# Patient Record
Sex: MALE | Race: WHITE | NOT HISPANIC OR LATINO
[De-identification: names, ages, dates, MRNs, and addresses within clinical notes are randomized per-mention and may not be internally consistent; named-entity substitution may affect disease eponyms.]

---

## 2017-05-08 ENCOUNTER — APPOINTMENT (OUTPATIENT)
Dept: ENDOCRINOLOGY | Facility: CLINIC | Age: 62
End: 2017-05-08

## 2017-05-08 VITALS
HEIGHT: 68 IN | SYSTOLIC BLOOD PRESSURE: 110 MMHG | BODY MASS INDEX: 33.65 KG/M2 | HEART RATE: 100 BPM | DIASTOLIC BLOOD PRESSURE: 64 MMHG | WEIGHT: 222 LBS | OXYGEN SATURATION: 98 %

## 2017-05-08 LAB
GLUCOSE BLDC GLUCOMTR-MCNC: 118
HBA1C MFR BLD HPLC: 6.6

## 2017-05-09 LAB
25(OH)D3 SERPL-MCNC: 35.8 NG/ML
ALBUMIN SERPL ELPH-MCNC: 4.3 G/DL
ALP BLD-CCNC: 83 U/L
ALT SERPL-CCNC: 20 U/L
ANION GAP SERPL CALC-SCNC: 20 MMOL/L
AST SERPL-CCNC: 20 U/L
BASOPHILS # BLD AUTO: 0.03 K/UL
BASOPHILS NFR BLD AUTO: 0.3 %
BILIRUB SERPL-MCNC: 0.6 MG/DL
BUN SERPL-MCNC: 16 MG/DL
CALCIUM SERPL-MCNC: 9.4 MG/DL
CHLORIDE SERPL-SCNC: 97 MMOL/L
CHOLEST SERPL-MCNC: 144 MG/DL
CHOLEST/HDLC SERPL: 2 RATIO
CO2 SERPL-SCNC: 24 MMOL/L
CREAT SERPL-MCNC: 0.64 MG/DL
CREAT SPEC-SCNC: 168 MG/DL
EOSINOPHIL # BLD AUTO: 0.52 K/UL
EOSINOPHIL NFR BLD AUTO: 4.4 %
FRUCTOSAMINE SERPL-MCNC: 248 UMOL/L
GLUCOSE SERPL-MCNC: 113 MG/DL
HCT VFR BLD CALC: 41.3 %
HDLC SERPL-MCNC: 64 MG/DL
HGB BLD-MCNC: 14.2 G/DL
IMM GRANULOCYTES NFR BLD AUTO: 0.3 %
LDLC SERPL CALC-MCNC: 54 MG/DL
LYMPHOCYTES # BLD AUTO: 2.93 K/UL
LYMPHOCYTES NFR BLD AUTO: 24.6 %
MAN DIFF?: NORMAL
MCHC RBC-ENTMCNC: 31.1 PG
MCHC RBC-ENTMCNC: 34.4 GM/DL
MCV RBC AUTO: 90.6 FL
MICROALBUMIN 24H UR DL<=1MG/L-MCNC: 7.2 MG/DL
MICROALBUMIN/CREAT 24H UR-RTO: 43
MONOCYTES # BLD AUTO: 1.14 K/UL
MONOCYTES NFR BLD AUTO: 9.6 %
NEUTROPHILS # BLD AUTO: 7.24 K/UL
NEUTROPHILS NFR BLD AUTO: 60.8 %
PLATELET # BLD AUTO: 320 K/UL
POTASSIUM SERPL-SCNC: 3.8 MMOL/L
PROT SERPL-MCNC: 7.4 G/DL
RBC # BLD: 4.56 M/UL
RBC # FLD: 13.1 %
SODIUM SERPL-SCNC: 141 MMOL/L
T4 FREE SERPL-MCNC: 1.3 NG/DL
TRIGL SERPL-MCNC: 128 MG/DL
TSH SERPL-ACNC: 3 UIU/ML
WBC # FLD AUTO: 11.89 K/UL

## 2017-06-08 ENCOUNTER — MEDICATION RENEWAL (OUTPATIENT)
Age: 62
End: 2017-06-08

## 2017-06-08 ENCOUNTER — RX RENEWAL (OUTPATIENT)
Age: 62
End: 2017-06-08

## 2017-07-10 ENCOUNTER — RX RENEWAL (OUTPATIENT)
Age: 62
End: 2017-07-10

## 2017-11-07 ENCOUNTER — APPOINTMENT (OUTPATIENT)
Dept: ENDOCRINOLOGY | Facility: CLINIC | Age: 62
End: 2017-11-07
Payer: COMMERCIAL

## 2017-11-07 VITALS
BODY MASS INDEX: 33.28 KG/M2 | HEIGHT: 68 IN | OXYGEN SATURATION: 98 % | WEIGHT: 219.6 LBS | HEART RATE: 105 BPM | SYSTOLIC BLOOD PRESSURE: 120 MMHG | DIASTOLIC BLOOD PRESSURE: 72 MMHG

## 2017-11-07 PROCEDURE — 99214 OFFICE O/P EST MOD 30 MIN: CPT

## 2017-11-07 RX ORDER — AMOXICILLIN 500 MG/1
500 CAPSULE ORAL
Qty: 20 | Refills: 0 | Status: DISCONTINUED | COMMUNITY
Start: 2017-08-20

## 2017-11-08 LAB
ALBUMIN SERPL ELPH-MCNC: 4.2 G/DL
ALP BLD-CCNC: 75 U/L
ALT SERPL-CCNC: 21 U/L
ANION GAP SERPL CALC-SCNC: 17 MMOL/L
AST SERPL-CCNC: 20 U/L
BASOPHILS # BLD AUTO: 0.02 K/UL
BASOPHILS NFR BLD AUTO: 0.2 %
BILIRUB SERPL-MCNC: 0.4 MG/DL
BUN SERPL-MCNC: 16 MG/DL
CALCIUM SERPL-MCNC: 9.7 MG/DL
CHLORIDE SERPL-SCNC: 100 MMOL/L
CHOLEST SERPL-MCNC: 174 MG/DL
CHOLEST/HDLC SERPL: 2.7 RATIO
CO2 SERPL-SCNC: 27 MMOL/L
CREAT SERPL-MCNC: 0.7 MG/DL
CREAT SPEC-SCNC: 114 MG/DL
EOSINOPHIL # BLD AUTO: 0.38 K/UL
EOSINOPHIL NFR BLD AUTO: 3.9 %
GLUCOSE SERPL-MCNC: 141 MG/DL
HBA1C MFR BLD HPLC: 6.3 %
HCT VFR BLD CALC: 37.3 %
HDLC SERPL-MCNC: 65 MG/DL
HGB BLD-MCNC: 12.9 G/DL
IMM GRANULOCYTES NFR BLD AUTO: 0.2 %
LDLC SERPL CALC-MCNC: 93 MG/DL
LYMPHOCYTES # BLD AUTO: 2.19 K/UL
LYMPHOCYTES NFR BLD AUTO: 22.7 %
MAN DIFF?: NORMAL
MCHC RBC-ENTMCNC: 31 PG
MCHC RBC-ENTMCNC: 34.6 GM/DL
MCV RBC AUTO: 89.7 FL
MICROALBUMIN 24H UR DL<=1MG/L-MCNC: 7.9 MG/DL
MICROALBUMIN/CREAT 24H UR-RTO: 69 MG/G
MONOCYTES # BLD AUTO: 0.84 K/UL
MONOCYTES NFR BLD AUTO: 8.7 %
NEUTROPHILS # BLD AUTO: 6.18 K/UL
NEUTROPHILS NFR BLD AUTO: 64.3 %
PLATELET # BLD AUTO: 285 K/UL
POTASSIUM SERPL-SCNC: 3.6 MMOL/L
PROT SERPL-MCNC: 7.3 G/DL
RBC # BLD: 4.16 M/UL
RBC # FLD: 13 %
SODIUM SERPL-SCNC: 144 MMOL/L
TRIGL SERPL-MCNC: 82 MG/DL
WBC # FLD AUTO: 9.63 K/UL

## 2017-11-13 ENCOUNTER — RX RENEWAL (OUTPATIENT)
Age: 62
End: 2017-11-13

## 2018-03-14 ENCOUNTER — RX RENEWAL (OUTPATIENT)
Age: 63
End: 2018-03-14

## 2018-05-08 ENCOUNTER — APPOINTMENT (OUTPATIENT)
Dept: ENDOCRINOLOGY | Facility: CLINIC | Age: 63
End: 2018-05-08
Payer: COMMERCIAL

## 2018-05-08 VITALS
OXYGEN SATURATION: 98 % | WEIGHT: 216 LBS | BODY MASS INDEX: 32.74 KG/M2 | HEART RATE: 88 BPM | HEIGHT: 68 IN | DIASTOLIC BLOOD PRESSURE: 82 MMHG | SYSTOLIC BLOOD PRESSURE: 120 MMHG

## 2018-05-08 LAB
GLUCOSE BLDC GLUCOMTR-MCNC: 156
HBA1C MFR BLD HPLC: 6.4

## 2018-05-08 PROCEDURE — 83036 HEMOGLOBIN GLYCOSYLATED A1C: CPT | Mod: QW

## 2018-05-08 PROCEDURE — 99214 OFFICE O/P EST MOD 30 MIN: CPT | Mod: 25

## 2018-05-08 PROCEDURE — 82962 GLUCOSE BLOOD TEST: CPT

## 2018-05-09 LAB
25(OH)D3 SERPL-MCNC: 38.3 NG/ML
ALBUMIN SERPL ELPH-MCNC: 4.3 G/DL
ALP BLD-CCNC: 70 U/L
ALT SERPL-CCNC: 17 U/L
ANION GAP SERPL CALC-SCNC: 16 MMOL/L
AST SERPL-CCNC: 17 U/L
BASOPHILS # BLD AUTO: 0.03 K/UL
BASOPHILS NFR BLD AUTO: 0.3 %
BILIRUB SERPL-MCNC: 0.4 MG/DL
BUN SERPL-MCNC: 16 MG/DL
CALCIUM SERPL-MCNC: 9.7 MG/DL
CHLORIDE SERPL-SCNC: 99 MMOL/L
CHOLEST SERPL-MCNC: 152 MG/DL
CHOLEST/HDLC SERPL: 2.3 RATIO
CO2 SERPL-SCNC: 27 MMOL/L
CREAT SERPL-MCNC: 0.73 MG/DL
CREAT SPEC-SCNC: 257 MG/DL
EOSINOPHIL # BLD AUTO: 0.45 K/UL
EOSINOPHIL NFR BLD AUTO: 4.7 %
GLUCOSE SERPL-MCNC: 147 MG/DL
HCT VFR BLD CALC: 39 %
HDLC SERPL-MCNC: 67 MG/DL
HGB BLD-MCNC: 13.2 G/DL
IMM GRANULOCYTES NFR BLD AUTO: 0.1 %
LDLC SERPL CALC-MCNC: 71 MG/DL
LYMPHOCYTES # BLD AUTO: 2.22 K/UL
LYMPHOCYTES NFR BLD AUTO: 23.1 %
MAN DIFF?: NORMAL
MCHC RBC-ENTMCNC: 30.7 PG
MCHC RBC-ENTMCNC: 33.8 GM/DL
MCV RBC AUTO: 90.7 FL
MICROALBUMIN 24H UR DL<=1MG/L-MCNC: 6 MG/DL
MICROALBUMIN/CREAT 24H UR-RTO: 23 MG/G
MONOCYTES # BLD AUTO: 0.92 K/UL
MONOCYTES NFR BLD AUTO: 9.6 %
NEUTROPHILS # BLD AUTO: 5.97 K/UL
NEUTROPHILS NFR BLD AUTO: 62.2 %
PLATELET # BLD AUTO: 309 K/UL
POTASSIUM SERPL-SCNC: 3.9 MMOL/L
PROT SERPL-MCNC: 7.4 G/DL
RBC # BLD: 4.3 M/UL
RBC # FLD: 13.1 %
SODIUM SERPL-SCNC: 142 MMOL/L
T4 FREE SERPL-MCNC: 1.3 NG/DL
TRIGL SERPL-MCNC: 68 MG/DL
TSH SERPL-ACNC: 2.61 UIU/ML
VIT B12 SERPL-MCNC: 293 PG/ML
WBC # FLD AUTO: 9.6 K/UL

## 2018-07-18 ENCOUNTER — RX RENEWAL (OUTPATIENT)
Age: 63
End: 2018-07-18

## 2018-11-14 ENCOUNTER — APPOINTMENT (OUTPATIENT)
Dept: ENDOCRINOLOGY | Facility: CLINIC | Age: 63
End: 2018-11-14
Payer: COMMERCIAL

## 2018-11-14 VITALS
HEART RATE: 110 BPM | OXYGEN SATURATION: 96 % | WEIGHT: 211.2 LBS | HEIGHT: 68 IN | DIASTOLIC BLOOD PRESSURE: 70 MMHG | BODY MASS INDEX: 32.01 KG/M2 | SYSTOLIC BLOOD PRESSURE: 110 MMHG

## 2018-11-14 LAB — HBA1C MFR BLD HPLC: 6.1

## 2018-11-14 PROCEDURE — 99214 OFFICE O/P EST MOD 30 MIN: CPT

## 2018-11-14 PROCEDURE — 83036 HEMOGLOBIN GLYCOSYLATED A1C: CPT | Mod: QW

## 2018-11-15 LAB
25(OH)D3 SERPL-MCNC: 42.8 NG/ML
ALBUMIN SERPL ELPH-MCNC: 4.3 G/DL
ALP BLD-CCNC: 82 U/L
ALT SERPL-CCNC: 16 U/L
ANION GAP SERPL CALC-SCNC: 19 MMOL/L
AST SERPL-CCNC: 21 U/L
BASOPHILS # BLD AUTO: 0.05 K/UL
BASOPHILS NFR BLD AUTO: 0.6 %
BILIRUB SERPL-MCNC: 0.6 MG/DL
BUN SERPL-MCNC: 14 MG/DL
CALCIUM SERPL-MCNC: 10.3 MG/DL
CHLORIDE SERPL-SCNC: 94 MMOL/L
CHOLEST SERPL-MCNC: 143 MG/DL
CHOLEST/HDLC SERPL: 2.3 RATIO
CO2 SERPL-SCNC: 25 MMOL/L
CREAT SERPL-MCNC: 0.78 MG/DL
CREAT SPEC-SCNC: 140 MG/DL
EOSINOPHIL # BLD AUTO: 0.44 K/UL
EOSINOPHIL NFR BLD AUTO: 4.9 %
GLUCOSE SERPL-MCNC: 123 MG/DL
HCT VFR BLD CALC: 41.1 %
HDLC SERPL-MCNC: 63 MG/DL
HGB BLD-MCNC: 14 G/DL
IMM GRANULOCYTES NFR BLD AUTO: 0.2 %
LDLC SERPL CALC-MCNC: 69 MG/DL
LYMPHOCYTES # BLD AUTO: 2.59 K/UL
LYMPHOCYTES NFR BLD AUTO: 29 %
MAN DIFF?: NORMAL
MCHC RBC-ENTMCNC: 31 PG
MCHC RBC-ENTMCNC: 34.1 GM/DL
MCV RBC AUTO: 91.1 FL
MICROALBUMIN 24H UR DL<=1MG/L-MCNC: 4.2 MG/DL
MICROALBUMIN/CREAT 24H UR-RTO: 30 MG/G
MONOCYTES # BLD AUTO: 0.97 K/UL
MONOCYTES NFR BLD AUTO: 10.9 %
NEUTROPHILS # BLD AUTO: 4.86 K/UL
NEUTROPHILS NFR BLD AUTO: 54.4 %
PLATELET # BLD AUTO: 370 K/UL
POTASSIUM SERPL-SCNC: 3.6 MMOL/L
PROT SERPL-MCNC: 7.2 G/DL
RBC # BLD: 4.51 M/UL
RBC # FLD: 12.8 %
SODIUM SERPL-SCNC: 138 MMOL/L
T4 FREE SERPL-MCNC: 1.4 NG/DL
TRIGL SERPL-MCNC: 57 MG/DL
TSH SERPL-ACNC: 2.51 UIU/ML
VIT B12 SERPL-MCNC: 290 PG/ML
WBC # FLD AUTO: 8.93 K/UL

## 2019-05-07 ENCOUNTER — RX RENEWAL (OUTPATIENT)
Age: 64
End: 2019-05-07

## 2019-05-14 ENCOUNTER — APPOINTMENT (OUTPATIENT)
Dept: ENDOCRINOLOGY | Facility: CLINIC | Age: 64
End: 2019-05-14
Payer: COMMERCIAL

## 2019-05-14 VITALS
WEIGHT: 219.13 LBS | HEIGHT: 68 IN | DIASTOLIC BLOOD PRESSURE: 80 MMHG | OXYGEN SATURATION: 97 % | BODY MASS INDEX: 33.21 KG/M2 | SYSTOLIC BLOOD PRESSURE: 120 MMHG | HEART RATE: 96 BPM

## 2019-05-14 LAB
GLUCOSE BLDC GLUCOMTR-MCNC: 119
HBA1C MFR BLD HPLC: 6.4

## 2019-05-14 PROCEDURE — 83036 HEMOGLOBIN GLYCOSYLATED A1C: CPT | Mod: QW

## 2019-05-14 PROCEDURE — 82962 GLUCOSE BLOOD TEST: CPT

## 2019-05-14 PROCEDURE — 99214 OFFICE O/P EST MOD 30 MIN: CPT | Mod: 25

## 2019-05-14 NOTE — PHYSICAL EXAM
[Alert] : alert [No Acute Distress] : no acute distress [Normal Sclera/Conjunctiva] : normal sclera/conjunctiva [EOMI] : extra ocular movement intact [Supple] : the neck was supple [No LAD] : no lymphadenopathy [Thyroid Not Enlarged] : the thyroid was not enlarged [No Accessory Muscle Use] : no accessory muscle use [Clear to Auscultation] : lungs were clear to auscultation bilaterally [Normal S1, S2] : normal S1 and S2 [Pedal Pulses Normal] : the pedal pulses are present [Regular Rhythm] : with a regular rhythm [No Edema] : there was no peripheral edema [Not Tender] : non-tender [Normal Bowel Sounds] : normal bowel sounds [Soft] : abdomen soft [Not Distended] : not distended [No Clubbing, Cyanosis] : no clubbing  or cyanosis of the fingernails [Right Foot Was Examined] : right foot ~C was examined [Left Foot Was Examined] : left foot ~C was examined [2+] : 2+ in the dorsalis pedis [Normal] : normal [Diminished Throughout Both Feet] : diminished tactile sensation with monofilament testing throughout both feet [Normal Reflexes] : deep tendon reflexes were 2+ and symmetric [Normal Affect] : the affect was normal [Normal Mood] : the mood was normal [de-identified] : does have psoriasis on abdomen and LE b/l [FreeTextEntry1] : callus [FreeTextEntry5] : callus

## 2019-05-14 NOTE — HISTORY OF PRESENT ILLNESS
[FreeTextEntry1] : 64 y.o. male with h/o Type 2 DM, HTN and hyperlipidemia here for follow up visit. No acute events since last visit. Reports some dizziness first thing in the morning rarely. Checks FS in the morning every 2 weeks and are variable with late afternoon readings being in the 130s. No hypoglycemia. No polyuria and no polydipsia.  Taking Metformin 500 mg po QID. No vision changes. UTD with optho (March 2019)and no retinopathy. UTD with podiatry (sees every 8 weeks) and no complaints. Diagnosed with plantar wart and being monitored. Has lost weight and eating less. Does exercise with stationary bike 3 to 4 times per week. Less walking. Saw cardiology in February 2019 for nuclear stress test and was normal. \par \par For vitamin D def, taking vitamin D3 2,000 IU daily.

## 2019-05-14 NOTE — ASSESSMENT
[Carbohydrate Consistent Diet] : carbohydrate consistent diet [Long Term Vascular Complications] : long term vascular complications of diabetes [Diabetes Foot Care] : diabetes foot care [Importance of Diet and Exercise] : importance of diet and exercise to improve glycemic control, achieve weight loss and improve cardiovascular health [FreeTextEntry1] : 64 y.o. male with h/o Type 2 DM, HTN, hyperlipidemia and vitamin D def.\par 1. Type 2 DM- Good overall control with Hba1c of 6.4% today. Encouraged carbohydrate consistent diet and exercise. Will continue Metformin 500 mg QID. Will check CMP  and urine microalb/cr ratio.\par 2. HTN- BP is at goal and will continue medications\par 3. Hyperlipidemia- Will check lipids and will continue statin\par 4. Vitamin D def- Will check 25 vitamin D level and adjust supplement accordingly\par \par Follow up in 6 months\par Consider dermatology follow up

## 2019-05-14 NOTE — REVIEW OF SYSTEMS
[Recent Weight Gain (___ Lbs)] : recent [unfilled] ~Ulb weight gain [Pain/Numbness of Digits] : pain/numbness of digits [Negative] : Gastrointestinal [Fatigue] : no fatigue [Recent Weight Loss (___ Lbs)] : no recent weight loss [Polyuria] : no polyuria [Polydipsia] : no polydipsia [Swelling] : no swelling

## 2019-05-15 LAB
25(OH)D3 SERPL-MCNC: 37.3 NG/ML
ALBUMIN SERPL ELPH-MCNC: 4.6 G/DL
ALP BLD-CCNC: 86 U/L
ALT SERPL-CCNC: 20 U/L
ANION GAP SERPL CALC-SCNC: 18 MMOL/L
AST SERPL-CCNC: 17 U/L
BASOPHILS # BLD AUTO: 0.08 K/UL
BASOPHILS NFR BLD AUTO: 0.8 %
BILIRUB SERPL-MCNC: 0.5 MG/DL
BUN SERPL-MCNC: 16 MG/DL
CALCIUM SERPL-MCNC: 10 MG/DL
CHLORIDE SERPL-SCNC: 97 MMOL/L
CHOLEST SERPL-MCNC: 164 MG/DL
CHOLEST/HDLC SERPL: 2.6 RATIO
CO2 SERPL-SCNC: 26 MMOL/L
CREAT SERPL-MCNC: 0.67 MG/DL
CREAT SPEC-SCNC: 225 MG/DL
EOSINOPHIL # BLD AUTO: 0.39 K/UL
EOSINOPHIL NFR BLD AUTO: 3.9 %
GLUCOSE SERPL-MCNC: 121 MG/DL
HCT VFR BLD CALC: 40.6 %
HDLC SERPL-MCNC: 64 MG/DL
HGB BLD-MCNC: 13.7 G/DL
IMM GRANULOCYTES NFR BLD AUTO: 0.3 %
LDLC SERPL CALC-MCNC: 84 MG/DL
LYMPHOCYTES # BLD AUTO: 2.64 K/UL
LYMPHOCYTES NFR BLD AUTO: 26.6 %
MAN DIFF?: NORMAL
MCHC RBC-ENTMCNC: 30.6 PG
MCHC RBC-ENTMCNC: 33.7 GM/DL
MCV RBC AUTO: 90.6 FL
MICROALBUMIN 24H UR DL<=1MG/L-MCNC: 7.5 MG/DL
MICROALBUMIN/CREAT 24H UR-RTO: 33 MG/G
MONOCYTES # BLD AUTO: 0.96 K/UL
MONOCYTES NFR BLD AUTO: 9.7 %
NEUTROPHILS # BLD AUTO: 5.82 K/UL
NEUTROPHILS NFR BLD AUTO: 58.7 %
PLATELET # BLD AUTO: 339 K/UL
POTASSIUM SERPL-SCNC: 3.6 MMOL/L
PROT SERPL-MCNC: 7.7 G/DL
RBC # BLD: 4.48 M/UL
RBC # FLD: 12.7 %
SODIUM SERPL-SCNC: 140 MMOL/L
TRIGL SERPL-MCNC: 79 MG/DL
TSH SERPL-ACNC: 3.12 UIU/ML
VIT B12 SERPL-MCNC: 310 PG/ML
WBC # FLD AUTO: 9.92 K/UL

## 2019-11-18 ENCOUNTER — APPOINTMENT (OUTPATIENT)
Dept: ENDOCRINOLOGY | Facility: CLINIC | Age: 64
End: 2019-11-18
Payer: COMMERCIAL

## 2019-11-18 VITALS
HEIGHT: 68 IN | BODY MASS INDEX: 32.13 KG/M2 | TEMPERATURE: 97.9 F | HEART RATE: 75 BPM | OXYGEN SATURATION: 95 % | SYSTOLIC BLOOD PRESSURE: 137 MMHG | WEIGHT: 212 LBS | DIASTOLIC BLOOD PRESSURE: 81 MMHG

## 2019-11-18 VITALS — SYSTOLIC BLOOD PRESSURE: 120 MMHG | DIASTOLIC BLOOD PRESSURE: 64 MMHG

## 2019-11-18 LAB — HBA1C MFR BLD HPLC: 6.3

## 2019-11-18 PROCEDURE — 36415 COLL VENOUS BLD VENIPUNCTURE: CPT

## 2019-11-18 PROCEDURE — 99214 OFFICE O/P EST MOD 30 MIN: CPT | Mod: 25

## 2019-11-18 PROCEDURE — 83036 HEMOGLOBIN GLYCOSYLATED A1C: CPT | Mod: QW

## 2019-11-18 NOTE — ASSESSMENT
[Carbohydrate Consistent Diet] : carbohydrate consistent diet [FreeTextEntry1] : 64 y.o. male with h/o Type 2 DM, HTN, hyperlipidemia and vitamin D def.\par 1. Type 2 DM- Good overall control with Hba1c of 6.3% today. Encouraged carbohydrate consistent diet and exercise. Will continue Metformin 500 mg QID. Will check CMP  and urine microalb/cr ratio.\par 2. HTN- BP is at goal and will continue medications\par 3. Hyperlipidemia- Will check lipids and will continue statin\par 4. Vitamin D def- Normal 25 vitamin D level and will continue supplement\par \par Follow up in 6 months\par Agree with dermatology follow up\par Already received flu vaccine [Diabetes Foot Care] : diabetes foot care [Importance of Diet and Exercise] : importance of diet and exercise to improve glycemic control, achieve weight loss and improve cardiovascular health [Long Term Vascular Complications] : long term vascular complications of diabetes

## 2019-11-18 NOTE — PHYSICAL EXAM
[Alert] : alert [No Acute Distress] : no acute distress [Normal Sclera/Conjunctiva] : normal sclera/conjunctiva [EOMI] : extra ocular movement intact [Supple] : the neck was supple [No LAD] : no lymphadenopathy [Thyroid Not Enlarged] : the thyroid was not enlarged [Clear to Auscultation] : lungs were clear to auscultation bilaterally [No Accessory Muscle Use] : no accessory muscle use [Regular Rhythm] : with a regular rhythm [Normal S1, S2] : normal S1 and S2 [Pedal Pulses Normal] : the pedal pulses are present [Normal Bowel Sounds] : normal bowel sounds [Not Tender] : non-tender [Soft] : abdomen soft [No Clubbing, Cyanosis] : no clubbing  or cyanosis of the fingernails [Not Distended] : not distended [Right Foot Was Examined] : right foot ~C was examined [Left Foot Was Examined] : left foot ~C was examined [Normal] : normal [2+] : 2+ in the dorsalis pedis [Diminished Throughout Both Feet] : diminished tactile sensation with monofilament testing throughout both feet [Normal Reflexes] : deep tendon reflexes were 2+ and symmetric [Normal Affect] : the affect was normal [Normal Mood] : the mood was normal [de-identified] : 2/6 STEPHAN [de-identified] : mild edema b/l [de-identified] : does have psoriasis on abdomen and LE b/l [FreeTextEntry1] : callus [FreeTextEntry5] : callus

## 2019-11-18 NOTE — REVIEW OF SYSTEMS
[Fatigue] : no fatigue [Recent Weight Gain (___ Lbs)] : no recent weight gain [Recent Weight Loss (___ Lbs)] : recent [unfilled] ~Ulb weight loss [Joint Pain] : joint pain [Polyuria] : no polyuria [Swelling] : swelling [Pain/Numbness of Digits] : pain/numbness of digits [Polydipsia] : no polydipsia [Negative] : Cardiovascular

## 2019-11-19 LAB
ALBUMIN SERPL ELPH-MCNC: 4.5 G/DL
ALP BLD-CCNC: 76 U/L
ALT SERPL-CCNC: 15 U/L
ANION GAP SERPL CALC-SCNC: 18 MMOL/L
AST SERPL-CCNC: 16 U/L
BASOPHILS # BLD AUTO: 0.05 K/UL
BASOPHILS NFR BLD AUTO: 0.5 %
BILIRUB SERPL-MCNC: 0.3 MG/DL
BUN SERPL-MCNC: 20 MG/DL
CALCIUM SERPL-MCNC: 10.1 MG/DL
CHLORIDE SERPL-SCNC: 99 MMOL/L
CHOLEST SERPL-MCNC: 181 MG/DL
CHOLEST/HDLC SERPL: 2.3 RATIO
CO2 SERPL-SCNC: 24 MMOL/L
CREAT SERPL-MCNC: 0.7 MG/DL
EOSINOPHIL # BLD AUTO: 0.27 K/UL
EOSINOPHIL NFR BLD AUTO: 2.5 %
GLUCOSE SERPL-MCNC: 110 MG/DL
HCT VFR BLD CALC: 40.1 %
HDLC SERPL-MCNC: 78 MG/DL
HGB BLD-MCNC: 13.4 G/DL
IMM GRANULOCYTES NFR BLD AUTO: 0.2 %
LDLC SERPL CALC-MCNC: 84 MG/DL
LYMPHOCYTES # BLD AUTO: 2.47 K/UL
LYMPHOCYTES NFR BLD AUTO: 22.6 %
MAN DIFF?: NORMAL
MCHC RBC-ENTMCNC: 31.1 PG
MCHC RBC-ENTMCNC: 33.4 GM/DL
MCV RBC AUTO: 93 FL
MONOCYTES # BLD AUTO: 0.82 K/UL
MONOCYTES NFR BLD AUTO: 7.5 %
NEUTROPHILS # BLD AUTO: 7.31 K/UL
NEUTROPHILS NFR BLD AUTO: 66.7 %
PLATELET # BLD AUTO: 353 K/UL
POTASSIUM SERPL-SCNC: 3.8 MMOL/L
PROT SERPL-MCNC: 7.5 G/DL
RBC # BLD: 4.31 M/UL
RBC # FLD: 12.6 %
SODIUM SERPL-SCNC: 141 MMOL/L
TRIGL SERPL-MCNC: 94 MG/DL
TSH SERPL-ACNC: 2.44 UIU/ML
VIT B12 SERPL-MCNC: 236 PG/ML
WBC # FLD AUTO: 10.94 K/UL

## 2020-05-07 ENCOUNTER — RX RENEWAL (OUTPATIENT)
Age: 65
End: 2020-05-07

## 2020-06-01 ENCOUNTER — RX RENEWAL (OUTPATIENT)
Age: 65
End: 2020-06-01

## 2020-06-05 ENCOUNTER — RX RENEWAL (OUTPATIENT)
Age: 65
End: 2020-06-05

## 2020-09-28 ENCOUNTER — APPOINTMENT (OUTPATIENT)
Dept: ENDOCRINOLOGY | Facility: CLINIC | Age: 65
End: 2020-09-28
Payer: COMMERCIAL

## 2020-09-28 VITALS
OXYGEN SATURATION: 98 % | WEIGHT: 220.06 LBS | TEMPERATURE: 98 F | HEART RATE: 101 BPM | SYSTOLIC BLOOD PRESSURE: 121 MMHG | HEIGHT: 68 IN | DIASTOLIC BLOOD PRESSURE: 76 MMHG | BODY MASS INDEX: 33.35 KG/M2

## 2020-09-28 LAB
GLUCOSE BLDC GLUCOMTR-MCNC: 115
HBA1C MFR BLD HPLC: 5.9

## 2020-09-28 PROCEDURE — 36415 COLL VENOUS BLD VENIPUNCTURE: CPT

## 2020-09-28 PROCEDURE — 83036 HEMOGLOBIN GLYCOSYLATED A1C: CPT | Mod: QW

## 2020-09-28 PROCEDURE — 99214 OFFICE O/P EST MOD 30 MIN: CPT | Mod: 25

## 2020-09-28 PROCEDURE — 82962 GLUCOSE BLOOD TEST: CPT | Mod: NC

## 2020-09-28 NOTE — HISTORY OF PRESENT ILLNESS
[FreeTextEntry1] : 65 y.o. male with h/o Type 2 DM, HTN and hyperlipidemia here for follow up visit. S/p left hip replacement in February 2020. Checks FS in the morning every 2 weeks and are variable with late afternoon readings being in the 120s and morning levels are in the 160s. No hypoglycemia. No polyuria and no polydipsia.  Taking Metformin 500 mg po QID. No vision changes. UTD with ophthalmology (March 2020) and no retinopathy. UTD with podiatry (sees every 8 weeks) and no complaints. Diagnosed with plantar wart and being monitored. Does have proteinuria. Does report weight gain. Does exercise with stationary bike 3 to 4 times per week. Doing some walking. Saw cardiology in February 2019 for nuclear stress test and was normal. Also seeing dermatology for psoriasis. \par \par For vitamin D def, taking vitamin D3 2,000 IU daily. \par \par Also taking vitamin B12 1,000 mcg daily.

## 2020-09-28 NOTE — REVIEW OF SYSTEMS
[Fatigue] : no fatigue [Recent Weight Gain (___ Lbs)] : recent [unfilled] ~Ulb weight gain [Recent Weight Loss (___ Lbs)] : no recent weight loss [Polyuria] : no polyuria [Joint Pain] : no joint pain [Pain/Numbness of Digits] : pain/numbness of digits [Polydipsia] : no polydipsia [Swelling] : swelling [Negative] : Gastrointestinal

## 2020-09-28 NOTE — ASSESSMENT
[FreeTextEntry1] : 65 y.o. male with h/o Type 2 DM, HTN, hyperlipidemia and vitamin D def.\par 1. Type 2 DM- Good overall control with Hba1c of 5.9% today. Encouraged carbohydrate consistent diet and exercise. Will continue Metformin 500 mg QID. Will check CMP  and urine microalb/cr ratio.\par 2. HTN- BP is at goal and will continue medications\par 3. Hyperlipidemia- Will check lipids and will continue statin\par 4. Vitamin D def- Will check 25 vitamin D level and will continue supplement\par \par Follow up in 6 months\par Agree with dermatology follow up\par Already received flu vaccine\par Labs drawn in the office today [Carbohydrate Consistent Diet] : carbohydrate consistent diet [Diabetes Foot Care] : diabetes foot care [Long Term Vascular Complications] : long term vascular complications of diabetes [Importance of Diet and Exercise] : importance of diet and exercise to improve glycemic control, achieve weight loss and improve cardiovascular health

## 2020-09-28 NOTE — PHYSICAL EXAM
[Alert] : alert [No Acute Distress] : no acute distress [Normal Sclera/Conjunctiva] : normal sclera/conjunctiva [EOMI] : extra ocular movement intact [No LAD] : no lymphadenopathy [Thyroid Not Enlarged] : the thyroid was not enlarged [No Thyroid Nodules] : no palpable thyroid nodules [No Respiratory Distress] : no respiratory distress [Clear to Auscultation] : lungs were clear to auscultation bilaterally [Normal S1, S2] : normal S1 and S2 [Regular Rhythm] : with a regular rhythm [Normal Bowel Sounds] : normal bowel sounds [Not Tender] : non-tender [Not Distended] : not distended [Soft] : abdomen soft [Normal Anterior Cervical Nodes] : no anterior cervical lymphadenopathy [No Clubbing, Cyanosis] : no clubbing  or cyanosis of the fingernails [Right Foot Was Examined] : right foot ~C was examined [Left Foot Was Examined] : left foot ~C was examined [Normal] : normal [2+] : 2+ in the dorsalis pedis [Diminished Throughout Both Feet] : diminished tactile sensation with monofilament testing throughout both feet [Normal Reflexes] : deep tendon reflexes were 2+ and symmetric [Normal Affect] : the affect was normal [Normal Mood] : the mood was normal [de-identified] : 3/6 STEPHAN [de-identified] : mild edema b/l [de-identified] : psoriatic lesions on abdomen and LE b/l [FreeTextEntry1] : callus [FreeTextEntry5] : callus

## 2020-09-29 LAB
25(OH)D3 SERPL-MCNC: 42 NG/ML
ALBUMIN SERPL ELPH-MCNC: 4.5 G/DL
ALP BLD-CCNC: 88 U/L
ALT SERPL-CCNC: 19 U/L
ANION GAP SERPL CALC-SCNC: 19 MMOL/L
AST SERPL-CCNC: 20 U/L
BASOPHILS # BLD AUTO: 0.05 K/UL
BASOPHILS NFR BLD AUTO: 0.5 %
BILIRUB SERPL-MCNC: 0.5 MG/DL
BUN SERPL-MCNC: 22 MG/DL
CALCIUM SERPL-MCNC: 10.2 MG/DL
CHLORIDE SERPL-SCNC: 93 MMOL/L
CHOLEST SERPL-MCNC: 163 MG/DL
CHOLEST/HDLC SERPL: 2.3 RATIO
CO2 SERPL-SCNC: 26 MMOL/L
CREAT SERPL-MCNC: 0.82 MG/DL
CREAT SPEC-SCNC: 291 MG/DL
EOSINOPHIL # BLD AUTO: 0.32 K/UL
EOSINOPHIL NFR BLD AUTO: 3.3 %
GLUCOSE SERPL-MCNC: 116 MG/DL
HCT VFR BLD CALC: 40.2 %
HDLC SERPL-MCNC: 71 MG/DL
HGB BLD-MCNC: 13.3 G/DL
IMM GRANULOCYTES NFR BLD AUTO: 0.2 %
LDLC SERPL CALC-MCNC: 72 MG/DL
LYMPHOCYTES # BLD AUTO: 1.81 K/UL
LYMPHOCYTES NFR BLD AUTO: 18.4 %
MAN DIFF?: NORMAL
MCHC RBC-ENTMCNC: 31.5 PG
MCHC RBC-ENTMCNC: 33.1 GM/DL
MCV RBC AUTO: 95.3 FL
MICROALBUMIN 24H UR DL<=1MG/L-MCNC: 3.3 MG/DL
MICROALBUMIN/CREAT 24H UR-RTO: 11 MG/G
MONOCYTES # BLD AUTO: 1.09 K/UL
MONOCYTES NFR BLD AUTO: 11.1 %
NEUTROPHILS # BLD AUTO: 6.54 K/UL
NEUTROPHILS NFR BLD AUTO: 66.5 %
PLATELET # BLD AUTO: 278 K/UL
POTASSIUM SERPL-SCNC: 4.3 MMOL/L
PROT SERPL-MCNC: 7.4 G/DL
PSA SERPL-MCNC: 1.94 NG/ML
RBC # BLD: 4.22 M/UL
RBC # FLD: 12.8 %
SODIUM SERPL-SCNC: 138 MMOL/L
TRIGL SERPL-MCNC: 103 MG/DL
TSH SERPL-ACNC: 2.73 UIU/ML
VIT B12 SERPL-MCNC: 1243 PG/ML
WBC # FLD AUTO: 9.83 K/UL

## 2020-11-02 ENCOUNTER — RX RENEWAL (OUTPATIENT)
Age: 65
End: 2020-11-02

## 2021-03-22 ENCOUNTER — APPOINTMENT (OUTPATIENT)
Dept: ENDOCRINOLOGY | Facility: CLINIC | Age: 66
End: 2021-03-22
Payer: COMMERCIAL

## 2021-03-22 VITALS
WEIGHT: 224 LBS | TEMPERATURE: 98.2 F | HEART RATE: 89 BPM | OXYGEN SATURATION: 97 % | SYSTOLIC BLOOD PRESSURE: 134 MMHG | DIASTOLIC BLOOD PRESSURE: 72 MMHG | HEIGHT: 68 IN | BODY MASS INDEX: 33.95 KG/M2

## 2021-03-22 LAB — HBA1C MFR BLD HPLC: 6.2

## 2021-03-22 PROCEDURE — 83036 HEMOGLOBIN GLYCOSYLATED A1C: CPT | Mod: QW

## 2021-03-22 PROCEDURE — 36415 COLL VENOUS BLD VENIPUNCTURE: CPT

## 2021-03-22 PROCEDURE — 99214 OFFICE O/P EST MOD 30 MIN: CPT | Mod: 25

## 2021-03-22 PROCEDURE — 99072 ADDL SUPL MATRL&STAF TM PHE: CPT

## 2021-03-22 RX ORDER — IMIQUIMOD 50 MG/G
5 CREAM TOPICAL
Qty: 24 | Refills: 0 | Status: ACTIVE | COMMUNITY
Start: 2021-03-18

## 2021-03-22 RX ORDER — ATORVASTATIN CALCIUM 80 MG/1
80 TABLET, FILM COATED ORAL
Qty: 30 | Refills: 0 | Status: ACTIVE | COMMUNITY
Start: 2021-01-19

## 2021-03-22 NOTE — HISTORY OF PRESENT ILLNESS
[FreeTextEntry1] : 66 y.o. male with h/o Type 2 DM, HTN and hyperlipidemia here for follow up visit. S/p left hip replacement in February 2020. Checks FS in the morning every 2 weeks and are variable with late afternoon readings being in the 100s to 120s and morning levels are in the 170s. No hypoglycemia. No polyuria and no polydipsia.  Taking Metformin 500 mg po QID. No vision changes. UTD with ophthalmology (2/2021) and no retinopathy. Needs eyeglasses for driving. UTD with podiatry (sees every 8 weeks) and no complaints. Diagnosed with plantar wart and being monitored. Does have proteinuria. Does report weight gain. Reports increase in snacking.  Does exercise with stationary bike 3 to 4 times per week. Doing some walking. Saw cardiology a few months and statin was increased. Had nuclear stress test and was normal. Also was seeing dermatology for psoriasis. \par \par For vitamin D def, taking vitamin D3 2,000 IU daily. \par \par Also taking vitamin B12 1,000 mcg daily.

## 2021-03-22 NOTE — ASSESSMENT
[FreeTextEntry1] : 66 y.o. male with h/o Type 2 DM, HTN, hyperlipidemia and vitamin D def.\par \par 1. Type 2 DM- Good overall control with Hba1c of 6.2% today. Encouraged carbohydrate consistent diet and exercise. Will continue Metformin 500 mg QID. Will check CMP and urine microalb/cr ratio.\par \par 2. HTN- BP is at goal and will continue medications\par \par 3. Hyperlipidemia- Will check lipids and will continue high intensity statin\par \par 4. Vitamin D def- Will check 25 vitamin D level and will continue supplement\par \par Follow up in 6 months\par Labs drawn in the office today [Carbohydrate Consistent Diet] : carbohydrate consistent diet [Diabetes Foot Care] : diabetes foot care [Long Term Vascular Complications] : long term vascular complications of diabetes [Importance of Diet and Exercise] : importance of diet and exercise to improve glycemic control, achieve weight loss and improve cardiovascular health

## 2021-03-22 NOTE — PHYSICAL EXAM
[Alert] : alert [No Acute Distress] : no acute distress [Normal Sclera/Conjunctiva] : normal sclera/conjunctiva [EOMI] : extra ocular movement intact [No LAD] : no lymphadenopathy [Thyroid Not Enlarged] : the thyroid was not enlarged [No Thyroid Nodules] : no palpable thyroid nodules [No Respiratory Distress] : no respiratory distress [Clear to Auscultation] : lungs were clear to auscultation bilaterally [Normal S1, S2] : normal S1 and S2 [Regular Rhythm] : with a regular rhythm [Normal Bowel Sounds] : normal bowel sounds [Not Tender] : non-tender [Not Distended] : not distended [Soft] : abdomen soft [Normal Anterior Cervical Nodes] : no anterior cervical lymphadenopathy [No Clubbing, Cyanosis] : no clubbing  or cyanosis of the fingernails [Right Foot Was Examined] : right foot ~C was examined [Left Foot Was Examined] : left foot ~C was examined [Normal] : normal [2+] : 2+ in the dorsalis pedis [Diminished Throughout Both Feet] : diminished tactile sensation with monofilament testing throughout both feet [Normal Reflexes] : deep tendon reflexes were 2+ and symmetric [Normal Affect] : the affect was normal [Normal Mood] : the mood was normal [de-identified] : 3/6 STEPHAN [de-identified] : mild edema b/l [de-identified] : psoriatic lesions on  LE b/l [FreeTextEntry1] : callus [FreeTextEntry5] : callus

## 2021-03-23 LAB
25(OH)D3 SERPL-MCNC: 43.1 NG/ML
ALBUMIN SERPL ELPH-MCNC: 4.5 G/DL
ALP BLD-CCNC: 96 U/L
ALT SERPL-CCNC: 16 U/L
ANION GAP SERPL CALC-SCNC: 16 MMOL/L
AST SERPL-CCNC: 18 U/L
BASOPHILS # BLD AUTO: 0.06 K/UL
BASOPHILS NFR BLD AUTO: 0.7 %
BILIRUB SERPL-MCNC: 0.6 MG/DL
BUN SERPL-MCNC: 17 MG/DL
CALCIUM SERPL-MCNC: 9.9 MG/DL
CHLORIDE SERPL-SCNC: 96 MMOL/L
CHOLEST SERPL-MCNC: 150 MG/DL
CO2 SERPL-SCNC: 28 MMOL/L
CREAT SERPL-MCNC: 0.72 MG/DL
CREAT SPEC-SCNC: 137 MG/DL
EOSINOPHIL # BLD AUTO: 0.57 K/UL
EOSINOPHIL NFR BLD AUTO: 6.6 %
FRUCTOSAMINE SERPL-MCNC: 253 UMOL/L
GLUCOSE SERPL-MCNC: 132 MG/DL
HCT VFR BLD CALC: 41.2 %
HDLC SERPL-MCNC: 68 MG/DL
HGB BLD-MCNC: 13.5 G/DL
IMM GRANULOCYTES NFR BLD AUTO: 0.2 %
LDLC SERPL CALC-MCNC: 69 MG/DL
LYMPHOCYTES # BLD AUTO: 2.26 K/UL
LYMPHOCYTES NFR BLD AUTO: 26 %
MAN DIFF?: NORMAL
MCHC RBC-ENTMCNC: 31 PG
MCHC RBC-ENTMCNC: 32.8 GM/DL
MCV RBC AUTO: 94.7 FL
MICROALBUMIN 24H UR DL<=1MG/L-MCNC: 7.7 MG/DL
MICROALBUMIN/CREAT 24H UR-RTO: 56 MG/G
MONOCYTES # BLD AUTO: 0.89 K/UL
MONOCYTES NFR BLD AUTO: 10.3 %
NEUTROPHILS # BLD AUTO: 4.88 K/UL
NEUTROPHILS NFR BLD AUTO: 56.2 %
NONHDLC SERPL-MCNC: 82 MG/DL
PLATELET # BLD AUTO: 304 K/UL
POTASSIUM SERPL-SCNC: 4.2 MMOL/L
PROT SERPL-MCNC: 7.5 G/DL
RBC # BLD: 4.35 M/UL
RBC # FLD: 12.6 %
SODIUM SERPL-SCNC: 140 MMOL/L
TRIGL SERPL-MCNC: 66 MG/DL
TSH SERPL-ACNC: 2.59 UIU/ML
VIT B12 SERPL-MCNC: 1395 PG/ML
WBC # FLD AUTO: 8.68 K/UL

## 2021-09-20 ENCOUNTER — RX RENEWAL (OUTPATIENT)
Age: 66
End: 2021-09-20

## 2021-09-27 ENCOUNTER — APPOINTMENT (OUTPATIENT)
Dept: ENDOCRINOLOGY | Facility: CLINIC | Age: 66
End: 2021-09-27
Payer: COMMERCIAL

## 2021-09-27 VITALS
BODY MASS INDEX: 32.74 KG/M2 | HEART RATE: 87 BPM | OXYGEN SATURATION: 98 % | TEMPERATURE: 98.6 F | WEIGHT: 216 LBS | HEIGHT: 68 IN | SYSTOLIC BLOOD PRESSURE: 120 MMHG | DIASTOLIC BLOOD PRESSURE: 70 MMHG

## 2021-09-27 LAB — HBA1C MFR BLD HPLC: 5.9

## 2021-09-27 PROCEDURE — 36415 COLL VENOUS BLD VENIPUNCTURE: CPT

## 2021-09-27 PROCEDURE — 99214 OFFICE O/P EST MOD 30 MIN: CPT | Mod: 25

## 2021-09-27 PROCEDURE — 83036 HEMOGLOBIN GLYCOSYLATED A1C: CPT | Mod: QW

## 2021-09-27 RX ORDER — ACITRETIN 10 MG/1
10 CAPSULE ORAL
Qty: 30 | Refills: 0 | Status: ACTIVE | COMMUNITY
Start: 2021-08-30

## 2021-09-27 RX ORDER — CYCLOSPORINE 100 MG/1
100 CAPSULE, LIQUID FILLED ORAL
Qty: 120 | Refills: 0 | Status: DISCONTINUED | COMMUNITY
Start: 2021-07-27

## 2021-09-27 RX ORDER — ATORVASTATIN CALCIUM 40 MG/1
40 TABLET, FILM COATED ORAL
Qty: 60 | Refills: 0 | Status: DISCONTINUED | COMMUNITY
Start: 2021-04-30

## 2021-09-27 NOTE — HISTORY OF PRESENT ILLNESS
[FreeTextEntry1] : 66 y.o. male with h/o Type 2 DM, HTN and hyperlipidemia here for follow up visit. S/p left hip replacement in February 2020. Checks FS in the morning a couple of times per week  and are variable with late afternoon readings being in the 130s and morning levels are in the 170s. No hypoglycemia. No polyuria and no polydipsia.  Taking Metformin 500 mg po QID. No vision changes. UTD with ophthalmology (2/2021) and no retinopathy. Needs eyeglasses for driving. UTD with podiatry (sees every 8 weeks) and no complaints. Diagnosed with plantar wart and being monitored. Does have proteinuria. Does report weight loss. Reports less snacking.  Does exercise with stationary bike 4 to 5 times per week. Less walking. Saw cardiology. Had nuclear stress test and was normal. Also was seeing dermatology for psoriasis and slightly improved on new medication Did not tolerate cyclosporine secondary to lethargy. \par \par For vitamin D def, taking vitamin D3 2,000 IU daily. \par \par Also taking vitamin B12 1,000 mcg daily.

## 2021-09-27 NOTE — ASSESSMENT
[Carbohydrate Consistent Diet] : carbohydrate consistent diet [Diabetes Foot Care] : diabetes foot care [Long Term Vascular Complications] : long term vascular complications of diabetes [Importance of Diet and Exercise] : importance of diet and exercise to improve glycemic control, achieve weight loss and improve cardiovascular health [FreeTextEntry1] : 66 y.o. male with h/o Type 2 DM, HTN, hyperlipidemia and vitamin D def.\par \par 1. Type 2 DM- Good overall control with Hba1c of 5.9% today. Encouraged carbohydrate consistent diet and exercise. Will continue Metformin 500 mg QID. Will check CMP and urine microalb/cr ratio.\par \par 2. HTN- BP is at goal and will continue medications\par \par 3. Hyperlipidemia- Will check lipids and will continue high intensity statin\par \par 4. Vitamin D def- Will check 25 vitamin D level and will continue supplement\par \par Follow up in 6 months\par Labs drawn in the office today

## 2021-09-27 NOTE — PHYSICAL EXAM
[Alert] : alert [No Acute Distress] : no acute distress [Normal Sclera/Conjunctiva] : normal sclera/conjunctiva [EOMI] : extra ocular movement intact [No LAD] : no lymphadenopathy [Thyroid Not Enlarged] : the thyroid was not enlarged [No Thyroid Nodules] : no palpable thyroid nodules [No Respiratory Distress] : no respiratory distress [Clear to Auscultation] : lungs were clear to auscultation bilaterally [Normal S1, S2] : normal S1 and S2 [Regular Rhythm] : with a regular rhythm [Normal Bowel Sounds] : normal bowel sounds [Not Tender] : non-tender [Not Distended] : not distended [Soft] : abdomen soft [Normal Anterior Cervical Nodes] : no anterior cervical lymphadenopathy [No Clubbing, Cyanosis] : no clubbing  or cyanosis of the fingernails [Right Foot Was Examined] : right foot ~C was examined [Left Foot Was Examined] : left foot ~C was examined [Normal] : normal [2+] : 2+ in the dorsalis pedis [Diminished Throughout Both Feet] : diminished tactile sensation with monofilament testing throughout both feet [Normal Reflexes] : deep tendon reflexes were 2+ and symmetric [Normal Affect] : the affect was normal [Normal Mood] : the mood was normal [de-identified] : 3/6 STEPHAN [de-identified] : mild edema b/l [de-identified] : psoriatic lesions on  LE b/l and abdomen [FreeTextEntry1] : callus [FreeTextEntry5] : callus

## 2021-09-27 NOTE — REVIEW OF SYSTEMS
[Recent Weight Loss (___ Lbs)] : recent [unfilled] ~Ulb weight loss [Pain/Numbness of Digits] : pain/numbness of digits [Swelling] : swelling [Negative] : Gastrointestinal [Fatigue] : no fatigue [Recent Weight Gain (___ Lbs)] : no recent weight gain [Polyuria] : no polyuria [Joint Pain] : no joint pain [Polydipsia] : no polydipsia

## 2021-09-28 LAB
25(OH)D3 SERPL-MCNC: 43.1 NG/ML
ALBUMIN SERPL ELPH-MCNC: 4.7 G/DL
ALP BLD-CCNC: 88 U/L
ALT SERPL-CCNC: 13 U/L
ANION GAP SERPL CALC-SCNC: 17 MMOL/L
AST SERPL-CCNC: 16 U/L
BASOPHILS # BLD AUTO: 0.06 K/UL
BASOPHILS NFR BLD AUTO: 0.8 %
BILIRUB SERPL-MCNC: 0.5 MG/DL
BUN SERPL-MCNC: 19 MG/DL
CALCIUM SERPL-MCNC: 10 MG/DL
CHLORIDE SERPL-SCNC: 99 MMOL/L
CHOLEST SERPL-MCNC: 152 MG/DL
CO2 SERPL-SCNC: 25 MMOL/L
CREAT SERPL-MCNC: 0.96 MG/DL
CREAT SPEC-SCNC: 172 MG/DL
EOSINOPHIL # BLD AUTO: 0.57 K/UL
EOSINOPHIL NFR BLD AUTO: 7.9 %
FOLATE SERPL-MCNC: 5.6 NG/ML
GLUCOSE SERPL-MCNC: 133 MG/DL
HCT VFR BLD CALC: 38.3 %
HDLC SERPL-MCNC: 71 MG/DL
HGB BLD-MCNC: 12.4 G/DL
IMM GRANULOCYTES NFR BLD AUTO: 0.1 %
LDLC SERPL CALC-MCNC: 68 MG/DL
LDLC SERPL DIRECT ASSAY-MCNC: 71 MG/DL
LYMPHOCYTES # BLD AUTO: 1.91 K/UL
LYMPHOCYTES NFR BLD AUTO: 26.6 %
MAN DIFF?: NORMAL
MCHC RBC-ENTMCNC: 32 PG
MCHC RBC-ENTMCNC: 32.4 GM/DL
MCV RBC AUTO: 98.7 FL
MICROALBUMIN 24H UR DL<=1MG/L-MCNC: 1.9 MG/DL
MICROALBUMIN/CREAT 24H UR-RTO: 11 MG/G
MONOCYTES # BLD AUTO: 0.62 K/UL
MONOCYTES NFR BLD AUTO: 8.6 %
NEUTROPHILS # BLD AUTO: 4.01 K/UL
NEUTROPHILS NFR BLD AUTO: 56 %
NONHDLC SERPL-MCNC: 82 MG/DL
PLATELET # BLD AUTO: 283 K/UL
POTASSIUM SERPL-SCNC: 4.3 MMOL/L
PROT SERPL-MCNC: 7.7 G/DL
PSA SERPL-MCNC: 2.17 NG/ML
RBC # BLD: 3.88 M/UL
RBC # FLD: 12.7 %
SODIUM SERPL-SCNC: 141 MMOL/L
TRIGL SERPL-MCNC: 66 MG/DL
TSH SERPL-ACNC: 1.94 UIU/ML
VIT B12 SERPL-MCNC: 1832 PG/ML
WBC # FLD AUTO: 7.18 K/UL

## 2021-10-20 ENCOUNTER — RX RENEWAL (OUTPATIENT)
Age: 66
End: 2021-10-20

## 2022-03-07 ENCOUNTER — APPOINTMENT (OUTPATIENT)
Dept: ENDOCRINOLOGY | Facility: CLINIC | Age: 67
End: 2022-03-07
Payer: COMMERCIAL

## 2022-03-07 VITALS
RESPIRATION RATE: 18 BRPM | HEART RATE: 86 BPM | BODY MASS INDEX: 32.58 KG/M2 | TEMPERATURE: 98 F | SYSTOLIC BLOOD PRESSURE: 123 MMHG | OXYGEN SATURATION: 98 % | HEIGHT: 68 IN | DIASTOLIC BLOOD PRESSURE: 73 MMHG | WEIGHT: 215 LBS

## 2022-03-07 LAB — HBA1C MFR BLD HPLC: 6.4

## 2022-03-07 PROCEDURE — 99214 OFFICE O/P EST MOD 30 MIN: CPT | Mod: 25

## 2022-03-07 PROCEDURE — 83036 HEMOGLOBIN GLYCOSYLATED A1C: CPT | Mod: QW

## 2022-03-07 PROCEDURE — 36415 COLL VENOUS BLD VENIPUNCTURE: CPT

## 2022-03-07 NOTE — ASSESSMENT
[Carbohydrate Consistent Diet] : carbohydrate consistent diet [Diabetes Foot Care] : diabetes foot care [Long Term Vascular Complications] : long term vascular complications of diabetes [Importance of Diet and Exercise] : importance of diet and exercise to improve glycemic control, achieve weight loss and improve cardiovascular health [Self Monitoring of Blood Glucose] : self monitoring of blood glucose [FreeTextEntry1] : 66 y.o. male with h/o Type 2 DM, HTN, hyperlipidemia and vitamin D def.\par \par 1. Type 2 DM- Good overall control with Hba1c of 6.4% today, prior was 5.9%. Encouraged a carbohydrate consistent diet and exercise. Will continue Metformin 500 mg QID. Will check CMP and urine alb/cr ratio. Will check fructosamine given anemia.\par \par 2. HTN- BP is at goal and will continue medications including ACE-I\par \par 3. Hyperlipidemia- Will check lipids and will continue high intensity statin\par \par 4. Vitamin D def- Will check 25 vitamin D level and will continue supplement\par \par Follow up in 6 months\par Labs drawn in the office today\par \par Seen and d/w Dr. Crook.\par Shilpi Cha NP ([facility])  Endocrine Fellow

## 2022-03-07 NOTE — REVIEW OF SYSTEMS
[Pain/Numbness of Digits] : pain/numbness of digits [Swelling] : swelling [Negative] : Gastrointestinal [Fatigue] : no fatigue [Recent Weight Gain (___ Lbs)] : no recent weight gain [Recent Weight Loss (___ Lbs)] : no recent weight loss [Polyuria] : no polyuria [Joint Pain] : no joint pain [Polydipsia] : no polydipsia

## 2022-03-07 NOTE — PHYSICAL EXAM
[Alert] : alert [No Acute Distress] : no acute distress [Normal Sclera/Conjunctiva] : normal sclera/conjunctiva [EOMI] : extra ocular movement intact [No LAD] : no lymphadenopathy [Thyroid Not Enlarged] : the thyroid was not enlarged [No Thyroid Nodules] : no palpable thyroid nodules [No Respiratory Distress] : no respiratory distress [Clear to Auscultation] : lungs were clear to auscultation bilaterally [Normal S1, S2] : normal S1 and S2 [Regular Rhythm] : with a regular rhythm [Normal Bowel Sounds] : normal bowel sounds [Not Tender] : non-tender [Not Distended] : not distended [Soft] : abdomen soft [Normal Anterior Cervical Nodes] : no anterior cervical lymphadenopathy [No Clubbing, Cyanosis] : no clubbing  or cyanosis of the fingernails [2+] : 2+ in the dorsalis pedis [Diminished Throughout Both Feet] : diminished tactile sensation with monofilament testing throughout both feet [Normal Reflexes] : deep tendon reflexes were 2+ and symmetric [Normal Affect] : the affect was normal [Normal Mood] : the mood was normal [Normal] : normal [Right foot was examined, including] : right foot ~C was examined, including visual inspection with sensory and pulse exams [Left foot was examined, including] : left foot ~C was examined, including visual inspection with sensory and pulse exams [de-identified] : 3/6 STEPHAN [de-identified] : mild edema b/l [de-identified] : psoriatic lesions on  LE b/l and abdomen [FreeTextEntry1] : callus [FreeTextEntry5] : callus [FreeTextEntry6] : abrasion on left 2nd digit

## 2022-03-07 NOTE — END OF VISIT
[Time Spent: ___ minutes] : I have spent [unfilled] minutes of time on the encounter. [FreeTextEntry3] : Patient seen and examined with our NP. I agree with above findings and plan.

## 2022-03-07 NOTE — HISTORY OF PRESENT ILLNESS
[FreeTextEntry1] : 66 y.o. male with h/o Type 2 DM, HTN and hyperlipidemia here for follow up visit. S/p left hip replacement in February 2020. Checks FS in the morning a couple of times per week  and are variable with late afternoon readings being in the 120s and morning levels are in the 160s. No hypoglycemia. No polyuria and no polydipsia.  Taking Metformin 500 mg po QID. No vision changes. UTD with ophthalmology (2/2022) and no retinopathy. Needs eyeglasses for driving. UTD with podiatry (sees every 8 weeks) and no complaints. Diagnosed with plantar wart and being monitored. Does have intermittent proteinuria. States weight has been stable. Reports less snacking.  Does exercise with stationary bike 4 to 5 times per week for 40 minutes. Less walking. Was seeing dermatology for psoriasis and slightly improved on new medication. Did not tolerate cyclosporine secondary to lethargy. Saw cardiology. Had nuclear stress test and was normal. Had echo January 2022 demonstrating moderate to severe aortic stenosis- was evaluated by cardiology and cardiothoracic surgery, states he may be a candidate for aortic valve replacement in the future. Getting repeat echo in 2 months. Saw his PCP recently, who is monitoring his mild anemia for now, with last colonoscopy in 2019.\par \par In regards to vitamin D def, taking vitamin D3 2,000 IU daily. \par \par Also taking vitamin B12 1,000 mcg daily.

## 2022-03-08 LAB
25(OH)D3 SERPL-MCNC: 41.1 NG/ML
ALBUMIN SERPL ELPH-MCNC: 4.4 G/DL
ALP BLD-CCNC: 108 U/L
ALT SERPL-CCNC: 14 U/L
ANION GAP SERPL CALC-SCNC: 17 MMOL/L
AST SERPL-CCNC: 14 U/L
BASOPHILS # BLD AUTO: 0.07 K/UL
BASOPHILS NFR BLD AUTO: 0.8 %
BILIRUB SERPL-MCNC: 0.4 MG/DL
BUN SERPL-MCNC: 22 MG/DL
CALCIUM SERPL-MCNC: 9.6 MG/DL
CHLORIDE SERPL-SCNC: 98 MMOL/L
CHOLEST SERPL-MCNC: 150 MG/DL
CO2 SERPL-SCNC: 24 MMOL/L
CREAT SERPL-MCNC: 0.91 MG/DL
CREAT SPEC-SCNC: 145 MG/DL
EGFR: 92 ML/MIN/1.73M2
EOSINOPHIL # BLD AUTO: 0.66 K/UL
EOSINOPHIL NFR BLD AUTO: 7.1 %
FOLATE SERPL-MCNC: 6.2 NG/ML
FRUCTOSAMINE SERPL-MCNC: 250 UMOL/L
GLUCOSE SERPL-MCNC: 130 MG/DL
HCT VFR BLD CALC: 38.1 %
HDLC SERPL-MCNC: 57 MG/DL
HGB BLD-MCNC: 12.3 G/DL
IMM GRANULOCYTES NFR BLD AUTO: 0.3 %
LDLC SERPL CALC-MCNC: 73 MG/DL
LYMPHOCYTES # BLD AUTO: 2.23 K/UL
LYMPHOCYTES NFR BLD AUTO: 24 %
MAN DIFF?: NORMAL
MCHC RBC-ENTMCNC: 30.5 PG
MCHC RBC-ENTMCNC: 32.3 GM/DL
MCV RBC AUTO: 94.5 FL
MICROALBUMIN 24H UR DL<=1MG/L-MCNC: 3.1 MG/DL
MICROALBUMIN/CREAT 24H UR-RTO: 21 MG/G
MONOCYTES # BLD AUTO: 0.77 K/UL
MONOCYTES NFR BLD AUTO: 8.3 %
NEUTROPHILS # BLD AUTO: 5.53 K/UL
NEUTROPHILS NFR BLD AUTO: 59.5 %
NONHDLC SERPL-MCNC: 93 MG/DL
PLATELET # BLD AUTO: 303 K/UL
POTASSIUM SERPL-SCNC: 4.6 MMOL/L
PROT SERPL-MCNC: 7.3 G/DL
RBC # BLD: 4.03 M/UL
RBC # FLD: 12.6 %
SODIUM SERPL-SCNC: 139 MMOL/L
TRIGL SERPL-MCNC: 103 MG/DL
TSH SERPL-ACNC: 4.07 UIU/ML
VIT B12 SERPL-MCNC: 1563 PG/ML
WBC # FLD AUTO: 9.29 K/UL

## 2022-09-12 ENCOUNTER — APPOINTMENT (OUTPATIENT)
Dept: ENDOCRINOLOGY | Facility: CLINIC | Age: 67
End: 2022-09-12

## 2022-11-14 ENCOUNTER — RX RENEWAL (OUTPATIENT)
Age: 67
End: 2022-11-14

## 2022-11-28 ENCOUNTER — APPOINTMENT (OUTPATIENT)
Dept: ENDOCRINOLOGY | Facility: CLINIC | Age: 67
End: 2022-11-28

## 2022-11-28 VITALS
HEART RATE: 89 BPM | SYSTOLIC BLOOD PRESSURE: 122 MMHG | OXYGEN SATURATION: 97 % | RESPIRATION RATE: 16 BRPM | HEIGHT: 68 IN | DIASTOLIC BLOOD PRESSURE: 78 MMHG | TEMPERATURE: 98.1 F | WEIGHT: 225 LBS | BODY MASS INDEX: 34.1 KG/M2

## 2022-11-28 LAB — HBA1C MFR BLD HPLC: 6.1

## 2022-11-28 PROCEDURE — 36415 COLL VENOUS BLD VENIPUNCTURE: CPT

## 2022-11-28 PROCEDURE — 83036 HEMOGLOBIN GLYCOSYLATED A1C: CPT | Mod: QW

## 2022-11-28 PROCEDURE — 99214 OFFICE O/P EST MOD 30 MIN: CPT | Mod: 25

## 2022-11-28 RX ORDER — FERROUS SULFATE TAB 325 MG (65 MG ELEMENTAL FE) 325 (65 FE) MG
325 (65 FE) TAB ORAL
Qty: 30 | Refills: 0 | Status: DISCONTINUED | COMMUNITY
Start: 2022-09-06

## 2022-11-28 RX ORDER — ASPIRIN 325 MG/1
325 TABLET ORAL
Qty: 30 | Refills: 0 | Status: ACTIVE | COMMUNITY
Start: 2022-09-06

## 2022-11-28 RX ORDER — METOPROLOL TARTRATE 25 MG/1
25 TABLET, FILM COATED ORAL
Qty: 30 | Refills: 0 | Status: DISCONTINUED | COMMUNITY
Start: 2022-09-06

## 2022-11-28 RX ORDER — FOLIC ACID 1 MG/1
1 TABLET ORAL
Qty: 30 | Refills: 0 | Status: DISCONTINUED | COMMUNITY
Start: 2022-09-06

## 2022-11-28 RX ORDER — METOPROLOL SUCCINATE 25 MG/1
25 TABLET, EXTENDED RELEASE ORAL
Qty: 30 | Refills: 0 | Status: ACTIVE | COMMUNITY
Start: 2022-11-26

## 2022-11-28 NOTE — ASSESSMENT
[Carbohydrate Consistent Diet] : carbohydrate consistent diet [Diabetes Foot Care] : diabetes foot care [Long Term Vascular Complications] : long term vascular complications of diabetes [Importance of Diet and Exercise] : importance of diet and exercise to improve glycemic control, achieve weight loss and improve cardiovascular health [Self Monitoring of Blood Glucose] : self monitoring of blood glucose [FreeTextEntry1] : 67 y.o. male with h/o Type 2 DM, HTN, hyperlipidemia and vitamin D def.\par \par 1. Type 2 DM- Good overall control with Hba1c of 6.1% today. Encouraged a carbohydrate consistent diet and exercise. Will continue Metformin 500 mg QID. Will check CMP and urine alb/cr ratio.\par \par 2. HTN- BP is at goal and will continue medications including ACE-I\par \par 3. Hyperlipidemia- Will check lipids and will continue high intensity statin\par \par 4. Vitamin D def- Will check 25 vitamin D level and will continue supplement\par \par Follow up in 6 months\par Labs drawn in the office today\par

## 2022-11-28 NOTE — PHYSICAL EXAM
[Alert] : alert [No Acute Distress] : no acute distress [Normal Sclera/Conjunctiva] : normal sclera/conjunctiva [EOMI] : extra ocular movement intact [No LAD] : no lymphadenopathy [Thyroid Not Enlarged] : the thyroid was not enlarged [No Thyroid Nodules] : no palpable thyroid nodules [No Respiratory Distress] : no respiratory distress [Clear to Auscultation] : lungs were clear to auscultation bilaterally [Normal S1, S2] : normal S1 and S2 [Regular Rhythm] : with a regular rhythm [Normal Bowel Sounds] : normal bowel sounds [Not Tender] : non-tender [Not Distended] : not distended [Soft] : abdomen soft [Normal Anterior Cervical Nodes] : no anterior cervical lymphadenopathy [No Clubbing, Cyanosis] : no clubbing  or cyanosis of the fingernails [Right foot was examined, including] : right foot ~C was examined, including visual inspection with sensory and pulse exams [Left foot was examined, including] : left foot ~C was examined, including visual inspection with sensory and pulse exams [Normal] : normal [2+] : 2+ in the dorsalis pedis [Diminished Throughout Both Feet] : diminished tactile sensation with monofilament testing throughout both feet [Normal Reflexes] : deep tendon reflexes were 2+ and symmetric [Normal Affect] : the affect was normal [Normal Mood] : the mood was normal [de-identified] : valve click [de-identified] : mild edema b/l [de-identified] : psoriatic lesions on  LE b/l  [FreeTextEntry1] : callus [FreeTextEntry5] : callus

## 2022-11-28 NOTE — REVIEW OF SYSTEMS
[Recent Weight Gain (___ Lbs)] : recent [unfilled] ~Ulb weight gain [Pain/Numbness of Digits] : pain/numbness of digits [Swelling] : swelling [Negative] : Gastrointestinal [Fatigue] : no fatigue [Recent Weight Loss (___ Lbs)] : no recent weight loss [Polyuria] : no polyuria [Joint Pain] : no joint pain [Polydipsia] : no polydipsia [de-identified] : psoriatic plaques

## 2022-11-28 NOTE — HISTORY OF PRESENT ILLNESS
[FreeTextEntry1] : 67 y.o. male with h/o Type 2 DM, HTN and hyperlipidemia here for follow up visit. S/p AVR on 9/1/22 at [facility]. Doing cardiac rehab 2 times per week. S/p left hip replacement in February 2020. \par \par Checks FS a couple of times per week  and are variable with late afternoon readings being in the 120s and 130s. No hypoglycemia. No polyuria and no polydipsia.  Taking Metformin 500 mg po QID. No vision changes. UTD with ophthalmology (2/2022) and no retinopathy. Needs eyeglasses for driving. UTD with podiatry (sees every 8 weeks) and no complaints. Diagnosed with plantar wart and being monitored. Does have intermittent proteinuria. States weight has increased.  Does exercise with stationary bike 2 times per week for 40 minutes. Less walking. Was seeing dermatology for psoriasis and slightly improved on new medication. Did not tolerate cyclosporine secondary to lethargy. Saw cardiology. Had nuclear stress test and was normal. Had echo January 2022 demonstrating moderate to severe aortic stenosis. Saw his PCP recently, who is monitoring his mild anemia for now, with last colonoscopy in 2019.\par \par In regards to vitamin D def, taking vitamin D3 2,000 IU daily. \par \par Also taking vitamin B12 1,000 mcg daily.

## 2022-11-29 LAB
25(OH)D3 SERPL-MCNC: 41.5 NG/ML
ALBUMIN SERPL ELPH-MCNC: 4.2 G/DL
ALP BLD-CCNC: 130 U/L
ALT SERPL-CCNC: 9 U/L
ANION GAP SERPL CALC-SCNC: 17 MMOL/L
AST SERPL-CCNC: 16 U/L
BASOPHILS # BLD AUTO: 0.06 K/UL
BASOPHILS NFR BLD AUTO: 0.7 %
BILIRUB SERPL-MCNC: 0.5 MG/DL
BUN SERPL-MCNC: 19 MG/DL
CALCIUM SERPL-MCNC: 9.4 MG/DL
CHLORIDE SERPL-SCNC: 98 MMOL/L
CHOLEST SERPL-MCNC: 151 MG/DL
CO2 SERPL-SCNC: 24 MMOL/L
CREAT SERPL-MCNC: 0.85 MG/DL
CREAT SPEC-SCNC: 165 MG/DL
EGFR: 94 ML/MIN/1.73M2
EOSINOPHIL # BLD AUTO: 0.4 K/UL
EOSINOPHIL NFR BLD AUTO: 4.6 %
GLUCOSE SERPL-MCNC: 121 MG/DL
HCT VFR BLD CALC: 36.2 %
HDLC SERPL-MCNC: 61 MG/DL
HGB BLD-MCNC: 11.4 G/DL
IMM GRANULOCYTES NFR BLD AUTO: 0.2 %
LDLC SERPL CALC-MCNC: 64 MG/DL
LYMPHOCYTES # BLD AUTO: 1.86 K/UL
LYMPHOCYTES NFR BLD AUTO: 21.3 %
MAN DIFF?: NORMAL
MCHC RBC-ENTMCNC: 27.7 PG
MCHC RBC-ENTMCNC: 31.5 GM/DL
MCV RBC AUTO: 88.1 FL
MICROALBUMIN 24H UR DL<=1MG/L-MCNC: 6.7 MG/DL
MICROALBUMIN/CREAT 24H UR-RTO: 41 MG/G
MONOCYTES # BLD AUTO: 0.75 K/UL
MONOCYTES NFR BLD AUTO: 8.6 %
NEUTROPHILS # BLD AUTO: 5.66 K/UL
NEUTROPHILS NFR BLD AUTO: 64.6 %
NONHDLC SERPL-MCNC: 90 MG/DL
PLATELET # BLD AUTO: 288 K/UL
POTASSIUM SERPL-SCNC: 4.3 MMOL/L
PROT SERPL-MCNC: 7.5 G/DL
PSA SERPL-MCNC: 2.08 NG/ML
RBC # BLD: 4.11 M/UL
RBC # FLD: 14.4 %
SODIUM SERPL-SCNC: 138 MMOL/L
TRIGL SERPL-MCNC: 130 MG/DL
TSH SERPL-ACNC: 2.89 UIU/ML
WBC # FLD AUTO: 8.75 K/UL

## 2023-03-14 ENCOUNTER — RX RENEWAL (OUTPATIENT)
Age: 68
End: 2023-03-14

## 2023-05-22 ENCOUNTER — APPOINTMENT (OUTPATIENT)
Dept: ENDOCRINOLOGY | Facility: CLINIC | Age: 68
End: 2023-05-22
Payer: MEDICARE

## 2023-05-22 VITALS
HEIGHT: 68 IN | TEMPERATURE: 98.2 F | SYSTOLIC BLOOD PRESSURE: 130 MMHG | DIASTOLIC BLOOD PRESSURE: 80 MMHG | WEIGHT: 225 LBS | HEART RATE: 88 BPM | OXYGEN SATURATION: 97 % | BODY MASS INDEX: 34.1 KG/M2

## 2023-05-22 LAB — HBA1C MFR BLD HPLC: 6.6

## 2023-05-22 PROCEDURE — 99214 OFFICE O/P EST MOD 30 MIN: CPT | Mod: 25

## 2023-05-22 PROCEDURE — 83036 HEMOGLOBIN GLYCOSYLATED A1C: CPT | Mod: QW

## 2023-05-22 PROCEDURE — 36415 COLL VENOUS BLD VENIPUNCTURE: CPT

## 2023-05-22 RX ORDER — BLOOD SUGAR DIAGNOSTIC
STRIP MISCELLANEOUS
Qty: 100 | Refills: 3 | Status: ACTIVE | COMMUNITY
Start: 2020-05-07 | End: 1900-01-01

## 2023-05-22 NOTE — HISTORY OF PRESENT ILLNESS
[FreeTextEntry1] : 68 y.o. male with h/o Type 2 DM, HTN and hyperlipidemia here for follow up visit. S/p AVR on 9/1/22 at [facility]. Finished cardiac rehab 2 times per week in March 2023. S/p left hip replacement in February 2020. \par \par Checks FS a couple of times per week  and are variable with late afternoon readings being in the 120s and 130s. No hypoglycemia. No polyuria and no polydipsia.  Taking Metformin 500 mg po QID. No vision changes. UTD with ophthalmology (2/2023) and no retinopathy. Needs eyeglasses for driving. UTD with podiatry (sees every 10 weeks) and no complaints. Diagnosed with plantar wart and being monitored. Does have intermittent proteinuria. States weight has increased.  Does exercise with stationary bike 3 to 4  times per week for 40 minutes. Was seeing dermatology for psoriasis. Did not tolerate cyclosporine secondary to lethargy. Saw cardiology and goes every 6 months. Had nuclear stress test and was normal. Had echo January 2022 demonstrating moderate to severe aortic stenosis. Sees his PCP,  who is monitoring his mild anemia for now, with last colonoscopy in 2019.\par \par In regards to vitamin D def, taking vitamin D3 2,000 IU daily. \par \par Also taking vitamin B12 1,000 mcg daily.

## 2023-05-22 NOTE — PHYSICAL EXAM
[Alert] : alert [No Acute Distress] : no acute distress [Normal Sclera/Conjunctiva] : normal sclera/conjunctiva [EOMI] : extra ocular movement intact [No LAD] : no lymphadenopathy [Thyroid Not Enlarged] : the thyroid was not enlarged [No Thyroid Nodules] : no palpable thyroid nodules [No Respiratory Distress] : no respiratory distress [Clear to Auscultation] : lungs were clear to auscultation bilaterally [Normal S1, S2] : normal S1 and S2 [Regular Rhythm] : with a regular rhythm [Normal Bowel Sounds] : normal bowel sounds [Not Tender] : non-tender [Not Distended] : not distended [Soft] : abdomen soft [Normal Anterior Cervical Nodes] : no anterior cervical lymphadenopathy [No Clubbing, Cyanosis] : no clubbing  or cyanosis of the fingernails [Right foot was examined, including] : right foot ~C was examined, including visual inspection with sensory and pulse exams [Left foot was examined, including] : left foot ~C was examined, including visual inspection with sensory and pulse exams [Normal] : normal [2+] : 2+ in the dorsalis pedis [Diminished Throughout Both Feet] : diminished tactile sensation with monofilament testing throughout both feet [Normal Reflexes] : deep tendon reflexes were 2+ and symmetric [Normal Affect] : the affect was normal [Normal Mood] : the mood was normal [de-identified] : valve click [de-identified] : mild edema b/l [de-identified] : psoriatic lesions on abdomen and  LE b/l  [FreeTextEntry1] : callus [FreeTextEntry5] : callus

## 2023-05-22 NOTE — REVIEW OF SYSTEMS
[Pain/Numbness of Digits] : pain/numbness of digits [Swelling] : swelling [Negative] : Gastrointestinal [Recent Weight Gain (___ Lbs)] : no recent weight gain [Fatigue] : no fatigue [Recent Weight Loss (___ Lbs)] : no recent weight loss [Polyuria] : no polyuria [Joint Pain] : no joint pain [Polydipsia] : no polydipsia [de-identified] : psoriatic plaques

## 2023-05-22 NOTE — ASSESSMENT
[Carbohydrate Consistent Diet] : carbohydrate consistent diet [Diabetes Foot Care] : diabetes foot care [Long Term Vascular Complications] : long term vascular complications of diabetes [Importance of Diet and Exercise] : importance of diet and exercise to improve glycemic control, achieve weight loss and improve cardiovascular health [Self Monitoring of Blood Glucose] : self monitoring of blood glucose [FreeTextEntry1] : 68 y.o. male with h/o Type 2 DM, HTN, hyperlipidemia and vitamin D def.\par \par 1. Type 2 DM- Good overall control with Hba1c of 6.6% today. Encouraged a carbohydrate consistent diet and exercise. Will continue Metformin 500 mg QID. We discussed adding GLP-1 agonist. Reviewed risks and benefits of GLP-1 agonists including GI side effects, pancreatitis and MTC. He will consider. Will check CMP and urine alb/cr ratio.\par \par 2. HTN- BP is at goal and will continue medications including ACE-I\par \par 3. Hyperlipidemia- Will check lipids and will continue high intensity statin\par \par 4. Vitamin D def- Will check 25 vitamin D level and will continue supplement\par \par Follow up in 6 months\par Labs drawn in the office today\par

## 2023-05-23 LAB
25(OH)D3 SERPL-MCNC: 43 NG/ML
ALBUMIN SERPL ELPH-MCNC: 4.2 G/DL
ALP BLD-CCNC: 104 U/L
ALT SERPL-CCNC: 9 U/L
ANION GAP SERPL CALC-SCNC: 16 MMOL/L
AST SERPL-CCNC: 16 U/L
BILIRUB SERPL-MCNC: 0.6 MG/DL
BUN SERPL-MCNC: 20 MG/DL
CALCIUM SERPL-MCNC: 9.3 MG/DL
CHLORIDE SERPL-SCNC: 99 MMOL/L
CHOLEST SERPL-MCNC: 140 MG/DL
CO2 SERPL-SCNC: 22 MMOL/L
CREAT SERPL-MCNC: 0.98 MG/DL
CREAT SPEC-SCNC: 178 MG/DL
EGFR: 83 ML/MIN/1.73M2
FOLATE SERPL-MCNC: 6.4 NG/ML
GLUCOSE SERPL-MCNC: 102 MG/DL
HDLC SERPL-MCNC: 62 MG/DL
LDLC SERPL CALC-MCNC: 52 MG/DL
MICROALBUMIN 24H UR DL<=1MG/L-MCNC: 4.1 MG/DL
MICROALBUMIN/CREAT 24H UR-RTO: 23 MG/G
NONHDLC SERPL-MCNC: 78 MG/DL
POTASSIUM SERPL-SCNC: 4.3 MMOL/L
PROT SERPL-MCNC: 7.3 G/DL
SODIUM SERPL-SCNC: 136 MMOL/L
TRIGL SERPL-MCNC: 128 MG/DL
TSH SERPL-ACNC: 1.63 UIU/ML
VIT B12 SERPL-MCNC: 1509 PG/ML

## 2023-09-10 ENCOUNTER — RX RENEWAL (OUTPATIENT)
Age: 68
End: 2023-09-10

## 2023-11-20 ENCOUNTER — APPOINTMENT (OUTPATIENT)
Dept: ENDOCRINOLOGY | Facility: CLINIC | Age: 68
End: 2023-11-20

## 2023-12-09 ENCOUNTER — RX RENEWAL (OUTPATIENT)
Age: 68
End: 2023-12-09

## 2024-02-27 ENCOUNTER — APPOINTMENT (OUTPATIENT)
Dept: ENDOCRINOLOGY | Facility: CLINIC | Age: 69
End: 2024-02-27
Payer: MEDICARE

## 2024-02-27 VITALS
DIASTOLIC BLOOD PRESSURE: 76 MMHG | WEIGHT: 224 LBS | HEART RATE: 96 BPM | OXYGEN SATURATION: 99 % | SYSTOLIC BLOOD PRESSURE: 150 MMHG | BODY MASS INDEX: 34.06 KG/M2

## 2024-02-27 DIAGNOSIS — E55.9 VITAMIN D DEFICIENCY, UNSPECIFIED: ICD-10-CM

## 2024-02-27 DIAGNOSIS — E78.5 HYPERLIPIDEMIA, UNSPECIFIED: ICD-10-CM

## 2024-02-27 DIAGNOSIS — E11.9 TYPE 2 DIABETES MELLITUS W/OUT COMPLICATIONS: ICD-10-CM

## 2024-02-27 DIAGNOSIS — I10 ESSENTIAL (PRIMARY) HYPERTENSION: ICD-10-CM

## 2024-02-27 LAB — HBA1C MFR BLD HPLC: 6.6

## 2024-02-27 PROCEDURE — 99214 OFFICE O/P EST MOD 30 MIN: CPT

## 2024-02-27 PROCEDURE — 83036 HEMOGLOBIN GLYCOSYLATED A1C: CPT | Mod: QW

## 2024-02-27 RX ORDER — AMLODIPINE BESYLATE 5 MG/1
5 TABLET ORAL
Refills: 0 | Status: ACTIVE | COMMUNITY

## 2024-02-27 NOTE — PHYSICAL EXAM
[Alert] : alert [No Acute Distress] : no acute distress [Normal Sclera/Conjunctiva] : normal sclera/conjunctiva [EOMI] : extra ocular movement intact [Thyroid Not Enlarged] : the thyroid was not enlarged [No LAD] : no lymphadenopathy [No Thyroid Nodules] : no palpable thyroid nodules [No Respiratory Distress] : no respiratory distress [Clear to Auscultation] : lungs were clear to auscultation bilaterally [Normal S1, S2] : normal S1 and S2 [Normal Bowel Sounds] : normal bowel sounds [Regular Rhythm] : with a regular rhythm [Not Tender] : non-tender [Soft] : abdomen soft [Not Distended] : not distended [Normal Anterior Cervical Nodes] : no anterior cervical lymphadenopathy [No Clubbing, Cyanosis] : no clubbing  or cyanosis of the fingernails [Left foot was examined, including] : left foot ~C was examined, including visual inspection with sensory and pulse exams [Normal] : normal [2+] : 2+ in the dorsalis pedis [Diminished Throughout Both Feet] : diminished tactile sensation with monofilament testing throughout both feet [Normal Reflexes] : deep tendon reflexes were 2+ and symmetric [Normal Affect] : the affect was normal [Normal Mood] : the mood was normal [Swelling] : swollen [de-identified] : valve click [de-identified] : mild edema b/l [de-identified] : psoriatic lesions on LE b/l  [FreeTextEntry5] : callus

## 2024-02-27 NOTE — ASSESSMENT
[Carbohydrate Consistent Diet] : carbohydrate consistent diet [Diabetes Foot Care] : diabetes foot care [Long Term Vascular Complications] : long term vascular complications of diabetes [Importance of Diet and Exercise] : importance of diet and exercise to improve glycemic control, achieve weight loss and improve cardiovascular health [Self Monitoring of Blood Glucose] : self monitoring of blood glucose [FreeTextEntry1] : 68 y.o. male with h/o Type 2 DM, HTN, hyperlipidemia and vitamin D def.  1. Type 2 DM- Good overall control with Hba1c of 6.6% today. Encouraged a carbohydrate consistent diet and exercise. Will continue Metformin 500 mg QID. We discussed adding GLP-1 RA. Reviewed risks and benefits of GLP-1 agonists including GI side effects, pancreatitis and MTC. He is not interested in starting GLP-1 RA at this time. UTD with CMP and will check urine alb/cr ratio.  2. HTN- BP is above goal and will continue medications including ACE-I. He will monitor at home.   3. Hyperlipidemia- UTD with lipids and will continue high intensity statin.   4. Vitamin D def- Normal 25 vitamin D level and will continue supplement.   Follow up in 6 months.  Follow up with podiatry.

## 2024-02-27 NOTE — HISTORY OF PRESENT ILLNESS
[FreeTextEntry1] : 68 y.o. male with h/o Type 2 DM, HTN and hyperlipidemia here for follow up visit.   S/p AVR on 9/1/22 at [facility].   Dealing with right ankle tendonitis and wearing boot now. Seeing podiatry.   Finished cardiac rehab 2 times per week in March 2023. S/p left hip replacement in February 2020.   Checks FS a couple of times per week and are variable with late afternoon readings being between 104 and 120s. No hypoglycemia. No polyuria and no polydipsia.  Taking Metformin 500 mg po QID. No vision changes.   UTD with ophthalmology (2/2023) and no retinopathy. Needs eyeglasses for driving. UTD with podiatry (sees every 10 weeks) and no complaints. Diagnosed with plantar wart and was treated. Does have intermittent proteinuria. States weight has increased.  No exercise for about 1 month. Was seeing dermatology for psoriasis. Did not tolerate cyclosporine secondary to lethargy. Saw cardiology and goes every 6 months. Had nuclear stress test and was normal. Had echo January 2022 demonstrating moderate to severe aortic stenosis. Sees his PCP, who is monitoring his mild anemia for now, with last colonoscopy in 2019.  In regard to vitamin D def, taking vitamin D3 2,000 IU daily.   Also taking vitamin B12 1,000 mcg daily.

## 2024-02-27 NOTE — REVIEW OF SYSTEMS
[Pain/Numbness of Digits] : pain/numbness of digits [Swelling] : swelling [Negative] : Gastrointestinal [Fatigue] : no fatigue [Recent Weight Gain (___ Lbs)] : no recent weight gain [Recent Weight Loss (___ Lbs)] : no recent weight loss [Polyuria] : no polyuria [Joint Pain] : no joint pain [Polydipsia] : no polydipsia [de-identified] : psoriatic plaques

## 2024-02-28 LAB
CREAT SPEC-SCNC: 190 MG/DL
MICROALBUMIN 24H UR DL<=1MG/L-MCNC: 82.7 MG/DL
MICROALBUMIN/CREAT 24H UR-RTO: 436 MG/G

## 2024-03-08 ENCOUNTER — RX RENEWAL (OUTPATIENT)
Age: 69
End: 2024-03-08

## 2024-08-27 ENCOUNTER — APPOINTMENT (OUTPATIENT)
Dept: ENDOCRINOLOGY | Facility: CLINIC | Age: 69
End: 2024-08-27
Payer: MEDICARE

## 2024-08-27 VITALS
WEIGHT: 214.5 LBS | HEART RATE: 79 BPM | RESPIRATION RATE: 16 BRPM | TEMPERATURE: 98.1 F | OXYGEN SATURATION: 99 % | SYSTOLIC BLOOD PRESSURE: 160 MMHG | BODY MASS INDEX: 32.51 KG/M2 | DIASTOLIC BLOOD PRESSURE: 78 MMHG | HEIGHT: 68 IN

## 2024-08-27 VITALS — SYSTOLIC BLOOD PRESSURE: 150 MMHG | DIASTOLIC BLOOD PRESSURE: 78 MMHG

## 2024-08-27 DIAGNOSIS — E53.8 DEFICIENCY OF OTHER SPECIFIED B GROUP VITAMINS: ICD-10-CM

## 2024-08-27 DIAGNOSIS — I10 ESSENTIAL (PRIMARY) HYPERTENSION: ICD-10-CM

## 2024-08-27 DIAGNOSIS — E55.9 VITAMIN D DEFICIENCY, UNSPECIFIED: ICD-10-CM

## 2024-08-27 DIAGNOSIS — E11.9 TYPE 2 DIABETES MELLITUS W/OUT COMPLICATIONS: ICD-10-CM

## 2024-08-27 DIAGNOSIS — E78.5 HYPERLIPIDEMIA, UNSPECIFIED: ICD-10-CM

## 2024-08-27 LAB — HBA1C MFR BLD HPLC: 5.9

## 2024-08-27 PROCEDURE — G2211 COMPLEX E/M VISIT ADD ON: CPT

## 2024-08-27 PROCEDURE — 83036 HEMOGLOBIN GLYCOSYLATED A1C: CPT | Mod: QW

## 2024-08-27 PROCEDURE — 99214 OFFICE O/P EST MOD 30 MIN: CPT

## 2024-08-27 PROCEDURE — 36415 COLL VENOUS BLD VENIPUNCTURE: CPT

## 2024-08-27 RX ORDER — DULAGLUTIDE 1.5 MG/.5ML
1.5 INJECTION, SOLUTION SUBCUTANEOUS
Qty: 1 | Refills: 6 | Status: ACTIVE | COMMUNITY
Start: 2024-08-27 | End: 1900-01-01

## 2024-08-27 NOTE — HISTORY OF PRESENT ILLNESS
[FreeTextEntry1] : 69 y.o. male with h/o Type 2 DM, HTN and hyperlipidemia here for follow up visit.   Saw vascular specialist secondary to calcifications detected on x-ray for knee evaluation (h/o b/l knee pain). Saw hematology for anemia.  Saw rheumatology and work up was normal.   S/p AVR on 9/1/22 at [facility].   Recovered from right ankle tendonitis. Sees podiatry every 10 weeks.   Finished cardiac rehab 2 times per week in March 2023. S/p left hip replacement in February 2020.   Checks FS a couple of times per month and are variable with late afternoon readings being between 120s and 130s. No hypoglycemia. No polyuria and no polydipsia.    Taking Metformin 500 mg po QID. Taking Trulicity 0.75 mg SQ weekly which he started in June 2024.   Did lose some weight. No vision changes.   UTD with ophthalmology (2024) and no retinopathy. Needs eyeglasses for driving. UTD with podiatry (sees every 10 weeks) and no complaints. Diagnosed with plantar wart and was treated. Does have intermittent proteinuria. States weight has decreased. Was seeing dermatology for psoriasis. Did not tolerate cyclosporine secondary to lethargy. Saw cardiology and goes every 6 months. Had nuclear stress test and was normal. Had echo January 2022 demonstrating moderate to severe aortic stenosis. Sees his PCP, who is monitoring his mild anemia for now, with last colonoscopy in 2019.  In regard to vitamin D def, taking vitamin D3 2,000 IU daily.   Also taking vitamin B12 1,000 mcg daily.

## 2024-08-27 NOTE — PHYSICAL EXAM
[Alert] : alert [No Acute Distress] : no acute distress [Normal Sclera/Conjunctiva] : normal sclera/conjunctiva [EOMI] : extra ocular movement intact [No LAD] : no lymphadenopathy [Thyroid Not Enlarged] : the thyroid was not enlarged [No Thyroid Nodules] : no palpable thyroid nodules [No Respiratory Distress] : no respiratory distress [Clear to Auscultation] : lungs were clear to auscultation bilaterally [Normal S1, S2] : normal S1 and S2 [Regular Rhythm] : with a regular rhythm [Normal Bowel Sounds] : normal bowel sounds [Not Tender] : non-tender [Not Distended] : not distended [Soft] : abdomen soft [Normal Anterior Cervical Nodes] : no anterior cervical lymphadenopathy [No Clubbing, Cyanosis] : no clubbing  or cyanosis of the fingernails [Left foot was examined, including] : left foot ~C was examined, including visual inspection with sensory and pulse exams [Swelling] : swollen [Diminished Throughout Both Feet] : diminished tactile sensation with monofilament testing throughout both feet [Normal Reflexes] : deep tendon reflexes were 2+ and symmetric [Normal Affect] : the affect was normal [Normal Mood] : the mood was normal [Right foot was examined, including] : right foot ~C was examined, including visual inspection with sensory and pulse exams [2+] : 2+ in the dorsalis pedis [de-identified] : valve click [de-identified] : mild edema b/l [de-identified] : psoriatic lesions on LE b/l  [FreeTextEntry1] : lesions noted [FreeTextEntry2] : hammer toes [FreeTextEntry5] : callus [FreeTextEntry6] : hammer toes

## 2024-08-27 NOTE — REVIEW OF SYSTEMS
[Pain/Numbness of Digits] : pain/numbness of digits [Swelling] : swelling [Negative] : Gastrointestinal [Fatigue] : no fatigue [Recent Weight Gain (___ Lbs)] : no recent weight gain [Recent Weight Loss (___ Lbs)] : no recent weight loss [Polyuria] : no polyuria [Joint Pain] : no joint pain [Polydipsia] : no polydipsia [de-identified] : psoriatic plaques

## 2024-08-27 NOTE — PHYSICAL EXAM
[Alert] : alert [No Acute Distress] : no acute distress [Normal Sclera/Conjunctiva] : normal sclera/conjunctiva [EOMI] : extra ocular movement intact [No LAD] : no lymphadenopathy [Thyroid Not Enlarged] : the thyroid was not enlarged [No Thyroid Nodules] : no palpable thyroid nodules [No Respiratory Distress] : no respiratory distress [Clear to Auscultation] : lungs were clear to auscultation bilaterally [Normal S1, S2] : normal S1 and S2 [Regular Rhythm] : with a regular rhythm [Normal Bowel Sounds] : normal bowel sounds [Not Tender] : non-tender [Not Distended] : not distended [Soft] : abdomen soft [Normal Anterior Cervical Nodes] : no anterior cervical lymphadenopathy [No Clubbing, Cyanosis] : no clubbing  or cyanosis of the fingernails [Left foot was examined, including] : left foot ~C was examined, including visual inspection with sensory and pulse exams [Swelling] : swollen [Diminished Throughout Both Feet] : diminished tactile sensation with monofilament testing throughout both feet [Normal Reflexes] : deep tendon reflexes were 2+ and symmetric [Normal Affect] : the affect was normal [Normal Mood] : the mood was normal [Right foot was examined, including] : right foot ~C was examined, including visual inspection with sensory and pulse exams [2+] : 2+ in the dorsalis pedis [de-identified] : valve click [de-identified] : mild edema b/l [de-identified] : psoriatic lesions on LE b/l  [FreeTextEntry1] : lesions noted [FreeTextEntry2] : hammer toes [FreeTextEntry5] : callus [FreeTextEntry6] : hammer toes

## 2024-08-27 NOTE — ASSESSMENT
[Carbohydrate Consistent Diet] : carbohydrate consistent diet [Diabetes Foot Care] : diabetes foot care [Long Term Vascular Complications] : long term vascular complications of diabetes [Importance of Diet and Exercise] : importance of diet and exercise to improve glycemic control, achieve weight loss and improve cardiovascular health [Self Monitoring of Blood Glucose] : self monitoring of blood glucose [FreeTextEntry1] : 69 y.o. male with h/o Type 2 DM, HTN, hyperlipidemia and vitamin D def.  1. Type 2 DM- Good overall control with Hba1c of 5.9% today. Encouraged a carbohydrate consistent diet and exercise. Will continue Metformin 500 mg QID. Will increase Trulicity to 1.5 mg SQ weekly. Reviewed risks and benefits of GLP-1 agonists including GI side effects, pancreatitis and MTC. To contact office with any hypoglycemia. Will check CMP and urine alb/cr ratio.  2. HTN- BP is above goal and will continue medications including ACE-I. He will monitor at home.   3. Hyperlipidemia- Will check lipids and will continue high intensity statin.   4. Vitamin D def- Will check 25 vitamin D level and will continue supplement.   Follow up in 6 months.  Follow up with podiatry.  Labs drawn in the office today.

## 2024-08-27 NOTE — HISTORY OF PRESENT ILLNESS
[FreeTextEntry1] : 69 y.o. male with h/o Type 2 DM, HTN and hyperlipidemia here for follow up visit.   Saw vascular specialist secondary to calcifications detected on x-ray for knee evaluation (h/o b/l knee pain). Saw hematology for anemia.  Saw rheumatology and work up was normal.   S/p AVR on 9/1/22 at [facility].   Recovered from right ankle tendonitis. Sees podiatry every 10 weeks.   Finished cardiac rehab 2 times per week in March 2023. S/p left hip replacement in February 2020.   Checks FS a couple of times per month and are variable with late afternoon readings being between 120s and 130s. No hypoglycemia. No polyuria and no polydipsia.    Taking Metformin 500 mg po QID. Taking Trulicity 0.75 mg SQ weekly which he started in June 2024.   Did lose some weight. No vision changes.   UTD with ophthalmology (2024) and no retinopathy. Needs eyeglasses for driving. UTD with podiatry (sees every 10 weeks) and no complaints. Diagnosed with plantar wart and was treated. Does have intermittent proteinuria. States weight has decreased. Was seeing dermatology for psoriasis. Did not tolerate cyclosporine secondary to lethargy. Saw cardiology and goes every 6 months. Had nuclear stress test and was normal. Had echo January 2022 demonstrating moderate to severe aortic stenosis. Sees his PCP, who is monitoring his mild anemia for now, with last colonoscopy in 2019.  In regard to vitamin D def, taking vitamin D3 2,000 IU daily.   Also taking vitamin B12 1,000 mcg daily.

## 2024-08-27 NOTE — REVIEW OF SYSTEMS
[Pain/Numbness of Digits] : pain/numbness of digits [Swelling] : swelling [Negative] : Gastrointestinal [Fatigue] : no fatigue [Recent Weight Gain (___ Lbs)] : no recent weight gain [Recent Weight Loss (___ Lbs)] : no recent weight loss [Polyuria] : no polyuria [Joint Pain] : no joint pain [Polydipsia] : no polydipsia [de-identified] : psoriatic plaques

## 2024-08-28 LAB
25(OH)D3 SERPL-MCNC: 42 NG/ML
ALBUMIN SERPL ELPH-MCNC: 4.2 G/DL
ALP BLD-CCNC: 111 U/L
ALT SERPL-CCNC: 10 U/L
ANION GAP SERPL CALC-SCNC: 16 MMOL/L
AST SERPL-CCNC: 18 U/L
BASOPHILS # BLD AUTO: 0.09 K/UL
BASOPHILS NFR BLD AUTO: 1.1 %
BILIRUB SERPL-MCNC: 0.6 MG/DL
BUN SERPL-MCNC: 16 MG/DL
CALCIUM SERPL-MCNC: 9 MG/DL
CHLORIDE SERPL-SCNC: 101 MMOL/L
CHOLEST SERPL-MCNC: 151 MG/DL
CO2 SERPL-SCNC: 23 MMOL/L
CREAT SERPL-MCNC: 0.76 MG/DL
CREAT SPEC-SCNC: 152 MG/DL
EGFR: 96 ML/MIN/1.73M2
EOSINOPHIL # BLD AUTO: 0.39 K/UL
EOSINOPHIL NFR BLD AUTO: 4.7 %
FOLATE SERPL-MCNC: 3.5 NG/ML
GLUCOSE SERPL-MCNC: 97 MG/DL
HCT VFR BLD CALC: 35.7 %
HDLC SERPL-MCNC: 72 MG/DL
HGB BLD-MCNC: 10.9 G/DL
IMM GRANULOCYTES NFR BLD AUTO: 0.4 %
LDLC SERPL CALC-MCNC: 61 MG/DL
LYMPHOCYTES # BLD AUTO: 1.97 K/UL
LYMPHOCYTES NFR BLD AUTO: 23.6 %
MAN DIFF?: NORMAL
MCHC RBC-ENTMCNC: 26.8 PG
MCHC RBC-ENTMCNC: 30.5 GM/DL
MCV RBC AUTO: 87.9 FL
MICROALBUMIN 24H UR DL<=1MG/L-MCNC: 46.5 MG/DL
MICROALBUMIN/CREAT 24H UR-RTO: 305 MG/G
MONOCYTES # BLD AUTO: 0.87 K/UL
MONOCYTES NFR BLD AUTO: 10.4 %
NEUTROPHILS # BLD AUTO: 4.98 K/UL
NEUTROPHILS NFR BLD AUTO: 59.8 %
NONHDLC SERPL-MCNC: 80 MG/DL
PLATELET # BLD AUTO: 281 K/UL
POTASSIUM SERPL-SCNC: 4.2 MMOL/L
PROT SERPL-MCNC: 7.2 G/DL
RBC # BLD: 4.06 M/UL
RBC # FLD: 17.7 %
SODIUM SERPL-SCNC: 139 MMOL/L
TRIGL SERPL-MCNC: 100 MG/DL
TSH SERPL-ACNC: 2.55 UIU/ML
VIT B12 SERPL-MCNC: 1271 PG/ML
WBC # FLD AUTO: 8.33 K/UL

## 2025-02-25 ENCOUNTER — APPOINTMENT (OUTPATIENT)
Dept: ENDOCRINOLOGY | Facility: CLINIC | Age: 70
End: 2025-02-25
Payer: MEDICARE

## 2025-02-25 VITALS
BODY MASS INDEX: 33.34 KG/M2 | SYSTOLIC BLOOD PRESSURE: 176 MMHG | DIASTOLIC BLOOD PRESSURE: 87 MMHG | HEART RATE: 93 BPM | HEIGHT: 68 IN | WEIGHT: 220 LBS | OXYGEN SATURATION: 97 %

## 2025-02-25 VITALS — SYSTOLIC BLOOD PRESSURE: 160 MMHG | DIASTOLIC BLOOD PRESSURE: 80 MMHG

## 2025-02-25 DIAGNOSIS — E53.8 DEFICIENCY OF OTHER SPECIFIED B GROUP VITAMINS: ICD-10-CM

## 2025-02-25 DIAGNOSIS — I10 ESSENTIAL (PRIMARY) HYPERTENSION: ICD-10-CM

## 2025-02-25 DIAGNOSIS — E11.9 TYPE 2 DIABETES MELLITUS W/OUT COMPLICATIONS: ICD-10-CM

## 2025-02-25 DIAGNOSIS — E55.9 VITAMIN D DEFICIENCY, UNSPECIFIED: ICD-10-CM

## 2025-02-25 DIAGNOSIS — E78.5 HYPERLIPIDEMIA, UNSPECIFIED: ICD-10-CM

## 2025-02-25 LAB — HBA1C MFR BLD HPLC: 5.6

## 2025-02-25 PROCEDURE — G2211 COMPLEX E/M VISIT ADD ON: CPT

## 2025-02-25 PROCEDURE — 99214 OFFICE O/P EST MOD 30 MIN: CPT

## 2025-02-25 PROCEDURE — 83036 HEMOGLOBIN GLYCOSYLATED A1C: CPT | Mod: QW

## 2025-04-17 ENCOUNTER — RX RENEWAL (OUTPATIENT)
Age: 70
End: 2025-04-17

## 2025-07-16 ENCOUNTER — RX RENEWAL (OUTPATIENT)
Age: 70
End: 2025-07-16

## 2025-07-28 RX ORDER — DULAGLUTIDE 1.5 MG/.5ML
1.5 INJECTION, SOLUTION SUBCUTANEOUS
Qty: 1 | Refills: 11 | Status: ACTIVE | COMMUNITY
Start: 2025-07-28 | End: 1900-01-01

## 2025-08-19 ENCOUNTER — APPOINTMENT (OUTPATIENT)
Dept: ENDOCRINOLOGY | Facility: CLINIC | Age: 70
End: 2025-08-19
Payer: MEDICARE

## 2025-08-19 VITALS
OXYGEN SATURATION: 97 % | HEART RATE: 74 BPM | WEIGHT: 208 LBS | DIASTOLIC BLOOD PRESSURE: 83 MMHG | HEIGHT: 68 IN | SYSTOLIC BLOOD PRESSURE: 137 MMHG | BODY MASS INDEX: 31.52 KG/M2

## 2025-08-19 DIAGNOSIS — E78.5 HYPERLIPIDEMIA, UNSPECIFIED: ICD-10-CM

## 2025-08-19 DIAGNOSIS — E53.8 DEFICIENCY OF OTHER SPECIFIED B GROUP VITAMINS: ICD-10-CM

## 2025-08-19 DIAGNOSIS — I10 ESSENTIAL (PRIMARY) HYPERTENSION: ICD-10-CM

## 2025-08-19 DIAGNOSIS — E11.9 TYPE 2 DIABETES MELLITUS W/OUT COMPLICATIONS: ICD-10-CM

## 2025-08-19 DIAGNOSIS — E55.9 VITAMIN D DEFICIENCY, UNSPECIFIED: ICD-10-CM

## 2025-08-19 LAB — HBA1C MFR BLD HPLC: 5.4

## 2025-08-19 PROCEDURE — G2211 COMPLEX E/M VISIT ADD ON: CPT

## 2025-08-19 PROCEDURE — 83036 HEMOGLOBIN GLYCOSYLATED A1C: CPT | Mod: QW

## 2025-08-19 PROCEDURE — 99214 OFFICE O/P EST MOD 30 MIN: CPT

## 2025-08-20 LAB
25(OH)D3 SERPL-MCNC: 44.7 NG/ML
ALBUMIN SERPL ELPH-MCNC: 3.9 G/DL
ALBUMIN, RANDOM URINE: 64.5 MG/DL
ALP BLD-CCNC: 119 U/L
ALT SERPL-CCNC: 11 U/L
ANION GAP SERPL CALC-SCNC: 17 MMOL/L
AST SERPL-CCNC: 18 U/L
BILIRUB SERPL-MCNC: 0.6 MG/DL
BUN SERPL-MCNC: 12 MG/DL
CALCIUM SERPL-MCNC: 10.8 MG/DL
CHLORIDE SERPL-SCNC: 100 MMOL/L
CHOLEST SERPL-MCNC: 140 MG/DL
CO2 SERPL-SCNC: 24 MMOL/L
CREAT SERPL-MCNC: 0.72 MG/DL
CREAT SPEC-SCNC: 279 MG/DL
EGFRCR SERPLBLD CKD-EPI 2021: 97 ML/MIN/1.73M2
FOLATE SERPL-MCNC: 11.4 NG/ML
GLUCOSE SERPL-MCNC: 113 MG/DL
HDLC SERPL-MCNC: 65 MG/DL
LDLC SERPL-MCNC: 59 MG/DL
MICROALBUMIN/CREAT 24H UR-RTO: 232 MG/G
NONHDLC SERPL-MCNC: 75 MG/DL
POTASSIUM SERPL-SCNC: 3.7 MMOL/L
PROT SERPL-MCNC: 6.9 G/DL
SODIUM SERPL-SCNC: 141 MMOL/L
TRIGL SERPL-MCNC: 82 MG/DL
TSH SERPL-ACNC: 1.52 UIU/ML
VIT B12 SERPL-MCNC: 1246 PG/ML